# Patient Record
Sex: FEMALE | Race: WHITE | Employment: OTHER | URBAN - METROPOLITAN AREA
[De-identification: names, ages, dates, MRNs, and addresses within clinical notes are randomized per-mention and may not be internally consistent; named-entity substitution may affect disease eponyms.]

---

## 2018-02-09 ENCOUNTER — HOSPITAL ENCOUNTER (OUTPATIENT)
Dept: MRI IMAGING | Age: 68
Discharge: HOME | End: 2018-02-09
Attending: ORTHOPAEDIC SURGERY
Payer: MEDICARE

## 2018-02-09 DIAGNOSIS — M54.40 LOW BACK PAIN WITH SCIATICA: ICD-10-CM

## 2018-02-09 PROCEDURE — 72148 MRI LUMBAR SPINE W/O DYE: CPT

## 2018-06-04 ENCOUNTER — HOSPITAL ENCOUNTER (OUTPATIENT)
Dept: PREADMISSION TESTING | Age: 68
Discharge: HOME | End: 2018-06-04
Attending: ORTHOPAEDIC SURGERY
Payer: MEDICARE

## 2018-06-04 ENCOUNTER — HOSPITAL ENCOUNTER (OUTPATIENT)
Dept: GENERAL RADIOLOGY | Age: 68
Discharge: HOME | End: 2018-06-04
Attending: ORTHOPAEDIC SURGERY
Payer: MEDICARE

## 2018-06-04 VITALS
TEMPERATURE: 97.9 F | BODY MASS INDEX: 27.51 KG/M2 | HEART RATE: 82 BPM | SYSTOLIC BLOOD PRESSURE: 149 MMHG | OXYGEN SATURATION: 99 % | DIASTOLIC BLOOD PRESSURE: 73 MMHG | RESPIRATION RATE: 20 BRPM | WEIGHT: 145.72 LBS | HEIGHT: 61 IN

## 2018-06-04 LAB
25(OH)D3 SERPL-MCNC: 19.4 NG/ML (ref 30–100)
ABO + RH BLD: NORMAL
ALBUMIN SERPL-MCNC: 4.1 G/DL (ref 3.5–5)
ALBUMIN/GLOB SERPL: 1 {RATIO} (ref 1.1–2.2)
ALP SERPL-CCNC: 75 U/L (ref 45–117)
ALT SERPL-CCNC: 58 U/L (ref 12–78)
ANION GAP SERPL CALC-SCNC: 6 MMOL/L (ref 5–15)
APPEARANCE UR: CLEAR
AST SERPL-CCNC: 24 U/L (ref 15–37)
BACTERIA URNS QL MICRO: NEGATIVE /HPF
BILIRUB SERPL-MCNC: 0.6 MG/DL (ref 0.2–1)
BILIRUB UR QL: NEGATIVE
BLOOD GROUP ANTIBODIES SERPL: NORMAL
BUN SERPL-MCNC: 37 MG/DL (ref 6–20)
BUN/CREAT SERPL: 23 (ref 12–20)
CALCIUM SERPL-MCNC: 9.6 MG/DL (ref 8.5–10.1)
CHLORIDE SERPL-SCNC: 105 MMOL/L (ref 97–108)
CO2 SERPL-SCNC: 28 MMOL/L (ref 21–32)
COLOR UR: NORMAL
CREAT SERPL-MCNC: 1.59 MG/DL (ref 0.55–1.02)
EPITH CASTS URNS QL MICRO: NORMAL /LPF
ERYTHROCYTE [DISTWIDTH] IN BLOOD BY AUTOMATED COUNT: 12 % (ref 11.5–14.5)
EST. AVERAGE GLUCOSE BLD GHB EST-MCNC: 111 MG/DL
GLOBULIN SER CALC-MCNC: 4 G/DL (ref 2–4)
GLUCOSE SERPL-MCNC: 90 MG/DL (ref 65–100)
GLUCOSE UR STRIP.AUTO-MCNC: NEGATIVE MG/DL
HBA1C MFR BLD: 5.5 % (ref 4.2–6.3)
HCT VFR BLD AUTO: 36.5 % (ref 35–47)
HGB BLD-MCNC: 12.1 G/DL (ref 11.5–16)
HGB UR QL STRIP: NEGATIVE
HYALINE CASTS URNS QL MICRO: NORMAL /LPF (ref 0–5)
INR PPP: 1 (ref 0.9–1.1)
KETONES UR QL STRIP.AUTO: NEGATIVE MG/DL
LEUKOCYTE ESTERASE UR QL STRIP.AUTO: NEGATIVE
MCH RBC QN AUTO: 31.6 PG (ref 26–34)
MCHC RBC AUTO-ENTMCNC: 33.2 G/DL (ref 30–36.5)
MCV RBC AUTO: 95.3 FL (ref 80–99)
NITRITE UR QL STRIP.AUTO: NEGATIVE
NRBC # BLD: 0 K/UL (ref 0–0.01)
NRBC BLD-RTO: 0 PER 100 WBC
PH UR STRIP: 6 [PH] (ref 5–8)
PLATELET # BLD AUTO: 334 K/UL (ref 150–400)
PMV BLD AUTO: 9.8 FL (ref 8.9–12.9)
POTASSIUM SERPL-SCNC: 4.4 MMOL/L (ref 3.5–5.1)
PREALB SERPL-MCNC: 33 MG/DL (ref 20–40)
PROT SERPL-MCNC: 8.1 G/DL (ref 6.4–8.2)
PROT UR STRIP-MCNC: NEGATIVE MG/DL
PROTHROMBIN TIME: 10 SEC (ref 9–11.1)
RBC # BLD AUTO: 3.83 M/UL (ref 3.8–5.2)
RBC #/AREA URNS HPF: NORMAL /HPF (ref 0–5)
SODIUM SERPL-SCNC: 139 MMOL/L (ref 136–145)
SP GR UR REFRACTOMETRY: 1.01 (ref 1–1.03)
SPECIMEN EXP DATE BLD: NORMAL
UA: UC IF INDICATED,UAUC: NORMAL
UROBILINOGEN UR QL STRIP.AUTO: 0.2 EU/DL (ref 0.2–1)
WBC # BLD AUTO: 6.6 K/UL (ref 3.6–11)
WBC URNS QL MICRO: NORMAL /HPF (ref 0–4)

## 2018-06-04 PROCEDURE — 83036 HEMOGLOBIN GLYCOSYLATED A1C: CPT | Performed by: ORTHOPAEDIC SURGERY

## 2018-06-04 PROCEDURE — 71046 X-RAY EXAM CHEST 2 VIEWS: CPT

## 2018-06-04 PROCEDURE — 81001 URINALYSIS AUTO W/SCOPE: CPT | Performed by: ORTHOPAEDIC SURGERY

## 2018-06-04 PROCEDURE — 93005 ELECTROCARDIOGRAM TRACING: CPT

## 2018-06-04 PROCEDURE — 80053 COMPREHEN METABOLIC PANEL: CPT | Performed by: ORTHOPAEDIC SURGERY

## 2018-06-04 PROCEDURE — 85027 COMPLETE CBC AUTOMATED: CPT | Performed by: ORTHOPAEDIC SURGERY

## 2018-06-04 PROCEDURE — 86900 BLOOD TYPING SEROLOGIC ABO: CPT | Performed by: ORTHOPAEDIC SURGERY

## 2018-06-04 PROCEDURE — 82306 VITAMIN D 25 HYDROXY: CPT | Performed by: ORTHOPAEDIC SURGERY

## 2018-06-04 PROCEDURE — 85610 PROTHROMBIN TIME: CPT | Performed by: ORTHOPAEDIC SURGERY

## 2018-06-04 PROCEDURE — 36415 COLL VENOUS BLD VENIPUNCTURE: CPT | Performed by: ORTHOPAEDIC SURGERY

## 2018-06-04 PROCEDURE — 84134 ASSAY OF PREALBUMIN: CPT | Performed by: ORTHOPAEDIC SURGERY

## 2018-06-04 RX ORDER — CEFAZOLIN SODIUM/WATER 2 G/20 ML
2 SYRINGE (ML) INTRAVENOUS ONCE
Status: CANCELLED | OUTPATIENT
Start: 2018-06-18 | End: 2018-06-18

## 2018-06-04 RX ORDER — DULOXETIN HYDROCHLORIDE 60 MG/1
60 CAPSULE, DELAYED RELEASE ORAL
COMMUNITY
End: 2019-01-29 | Stop reason: ALTCHOICE

## 2018-06-04 RX ORDER — PREGABALIN 150 MG/1
150 CAPSULE ORAL ONCE
Status: CANCELLED | OUTPATIENT
Start: 2018-06-18 | End: 2018-06-18

## 2018-06-04 RX ORDER — SODIUM CHLORIDE, SODIUM LACTATE, POTASSIUM CHLORIDE, CALCIUM CHLORIDE 600; 310; 30; 20 MG/100ML; MG/100ML; MG/100ML; MG/100ML
25 INJECTION, SOLUTION INTRAVENOUS CONTINUOUS
Status: CANCELLED | OUTPATIENT
Start: 2018-06-18

## 2018-06-04 RX ORDER — ACETAMINOPHEN 10 MG/ML
1000 INJECTION, SOLUTION INTRAVENOUS ONCE
Status: CANCELLED | OUTPATIENT
Start: 2018-06-18 | End: 2018-06-18

## 2018-06-04 RX ORDER — DICLOFENAC SODIUM 75 MG/1
75 TABLET, DELAYED RELEASE ORAL 2 TIMES DAILY
COMMUNITY
End: 2019-01-29

## 2018-06-04 NOTE — PERIOP NOTES
[facility]  Preoperative Instructions        Surgery Date 06/18/18         Time of Arrival 1 PM  Contact # [phone] [facility]/[phone]    1. On the day of your surgery, please report to the Surgical Services Registration Desk and sign in at your designated time. The Surgery Center is located to the right of the Emergency Room. 2. You must have someone with you to drive you home. You should not drive a car for 24 hours following surgery. Please make arrangements for a friend or family member to stay with you for the first 24 hours after your surgery. 3. Do not have anything to eat or drink (including water, gum, mints, coffee, juice) after midnight ?06/17/18 . ? This may not apply to medications prescribed by your physician. ?(Please note below the special instructions with medications to take the morning of your procedure.)    4. We recommend you do not drink any alcoholic beverages for 24 hours before and after your surgery. 5. Contact your surgeons office for instructions on the following medications: non-steroidal anti-inflammatory drugs (i.e. Advil, Aleve), vitamins, and supplements. (Some surgeons will want you to stop these medications prior to surgery and others may allow you to take them)  **If you are currently taking Plavix, Coumadin, Aspirin and/or other blood-thinning agents, contact your surgeon for instructions. ** Your surgeon will partner with the physician prescribing these medications to determine if it is safe to stop or if you need to continue taking. Please do not stop taking these medications without instructions from your surgeon    6. Wear comfortable clothes. Wear glasses instead of contacts. Do not bring any money or jewelry. Please bring picture ID, insurance card, and any prearranged co-payment or hospital payment. Do not wear make-up, particularly mascara the morning of your surgery.   Do not wear nail polish, particularly if you are having foot /hand surgery. Wear your hair loose or down, no ponytails, buns, jadon pins or clips. All body piercings must be removed. Please shower with antibacterial soap for three consecutive days before and on the morning of surgery, but do not apply any lotions, powders or deodorants after the shower on the day of surgery. Please use a fresh towels after each shower. Please sleep in clean clothes and change bed linens the night before surgery. Please do not shave for 48 hours prior to surgery. Shaving of the face is acceptable. 7. You should understand that if you do not follow these instructions your surgery may be cancelled. If your physical condition changes (I.e. fever, cold or flu) please contact your surgeon as soon as possible. 8. It is important that you be on time. If a situation occurs where you may be late, please call [phone] (OR Holding Area). 9. If you have any questions and or problems, please call [phone] (Pre-admission Testing). 10. Your surgery time may be subject to change. You will receive a phone call the evening prior if your time changes. 11.  If having outpatient surgery, you must have someone to drive you here, stay with you during the duration of your stay, and to drive you home at time of discharge. 12.   In an effort to improve the efficiency, privacy, and safety for all of our Pre-op patients visitors are not allowed in the Holding area. Once you arrive and are registered your family/visitors will be asked to remain in the waiting room. The Pre-op staff will get you from the Surgical Waiting Area and will explain to you and your family/visitors that the Pre-op phase is beginning. The staff will answer any questions and provide instructions for tracking of the patient, by use of the existing tracking number and color-coded status board in the waiting room.   At this time the staff will also ask for your designated spokesperson information in the event that the physician or staff need to provide an update or obtain any pertinent information. The designated spokesperson will be notified if the physician needs to speak to family during the pre-operative phase. If at any time your family/visitors has questions or concerns they may approach the volunteer desk in the waiting area for assistance. Special Instructions:Practice with incentive spirometry/ please bring incentive spirometry day of surgery    MEDICATIONS TO TAKE THE MORNING OF SURGERY WITH A SIP OF WATER:cymbalta,tylenol as needed      I understand a pre-operative phone call will be made to verify my surgery time. In the event that I am not available, I give permission for a message to be left on my answering service and/or with another person?   yes          ___________________      __________   _________    (Signature of Patient)             (Witness)                (Date and Time)

## 2018-06-04 NOTE — PERIOP NOTES
Incentive Spirometer        Using the incentive spirometer helps expand the small air sacs of your lungs, helps you breathe deeply, and helps improve your lung function. Use your incentive spirometer twice a day (10 breaths each time) prior to surgery. How to Use Your Incentive Spirometer:  1. Hold the incentive spirometer in an upright position. 2. Breathe out as usual.   3. Place the mouthpiece in your mouth and seal your lips tightly around it. 4. Take a deep breath. Breathe in slowly and as deeply as possible. Keep the blue flow rate guide between the arrows. 5. Hold your breath as long as possible. Then exhale slowly and allow the piston to fall to the bottom of the column. 6. Rest for a few seconds and repeat steps one through five at least 10 times. PAT Tidal Volume______1500____________  x__2______________  Date______06/04/18_________________    Consuello Curl THE INCENTIVE SPIROMETER WITH YOU TO THE HOSPITAL ON THE DAY OF YOUR SURGERY. Opportunity given to ask and answer questions as well as to observe return demonstration.     Patient signature_____________________________    Witness____________________________

## 2018-06-05 LAB
ATRIAL RATE: 66 BPM
BACTERIA SPEC CULT: ABNORMAL
BACTERIA SPEC CULT: ABNORMAL
CALCULATED P AXIS, ECG09: 69 DEGREES
CALCULATED R AXIS, ECG10: 3 DEGREES
CALCULATED T AXIS, ECG11: 37 DEGREES
DIAGNOSIS, 93000: NORMAL
P-R INTERVAL, ECG05: 154 MS
Q-T INTERVAL, ECG07: 370 MS
QRS DURATION, ECG06: 66 MS
QTC CALCULATION (BEZET), ECG08: 387 MS
SERVICE CMNT-IMP: ABNORMAL
VENTRICULAR RATE, ECG03: 66 BPM

## 2018-06-06 RX ORDER — MUPIROCIN 20 MG/G
OINTMENT TOPICAL 2 TIMES DAILY
Qty: 22 G | Refills: 0 | Status: SHIPPED | OUTPATIENT
Start: 2018-06-06 | End: 2018-06-11

## 2018-06-06 RX ORDER — MUPIROCIN 20 MG/G
OINTMENT TOPICAL 2 TIMES DAILY
COMMUNITY
Start: 2018-06-06 | End: 2018-06-11

## 2018-06-06 RX ORDER — CHOLECALCIFEROL (VITAMIN D3) 125 MCG
1 CAPSULE ORAL DAILY
COMMUNITY

## 2018-06-06 NOTE — ADVANCED PRACTICE NURSE
PC to pt regarding positive nasal culture and need for tx with Mupirocin ointment BID x 5 days to B nares starting today. Pt also advised of results of vitamin D level and recommendation per Dr. Lori Kilgore to take OTC Vitamin D 2000 iu daily. Pt states she had pre-op appt today with Dr. Chaim Car and was advised of the same Vitamin D instructions. Rx escribed to pt's pharmacy of choice. PTA medlist updated.

## 2018-06-17 ENCOUNTER — ANESTHESIA EVENT (OUTPATIENT)
Dept: SURGERY | Age: 68
DRG: 520 | End: 2018-06-17
Payer: MEDICARE

## 2018-06-18 ENCOUNTER — APPOINTMENT (OUTPATIENT)
Dept: GENERAL RADIOLOGY | Age: 68
DRG: 520 | End: 2018-06-18
Attending: ORTHOPAEDIC SURGERY
Payer: MEDICARE

## 2018-06-18 ENCOUNTER — HOSPITAL ENCOUNTER (INPATIENT)
Age: 68
LOS: 1 days | Discharge: HOME | DRG: 520 | End: 2018-06-18
Attending: ORTHOPAEDIC SURGERY | Admitting: ORTHOPAEDIC SURGERY
Payer: MEDICARE

## 2018-06-18 ENCOUNTER — ANESTHESIA (OUTPATIENT)
Dept: SURGERY | Age: 68
DRG: 520 | End: 2018-06-18
Payer: MEDICARE

## 2018-06-18 VITALS
DIASTOLIC BLOOD PRESSURE: 71 MMHG | BODY MASS INDEX: 27.3 KG/M2 | SYSTOLIC BLOOD PRESSURE: 120 MMHG | RESPIRATION RATE: 16 BRPM | OXYGEN SATURATION: 92 % | HEART RATE: 79 BPM | TEMPERATURE: 97.7 F | HEIGHT: 61 IN | WEIGHT: 144.62 LBS

## 2018-06-18 DIAGNOSIS — M71.38 SYNOVIAL CYST OF LUMBAR SPINE: Primary | ICD-10-CM

## 2018-06-18 LAB
ANION GAP BLD CALC-SCNC: 14 MMOL/L (ref 10–20)
BUN BLD-MCNC: 31 MG/DL (ref 9–20)
CA-I BLD-MCNC: 1.22 MMOL/L (ref 1.12–1.32)
CHLORIDE BLD-SCNC: 104 MMOL/L (ref 98–107)
CO2 BLD-SCNC: 26 MMOL/L (ref 21–32)
CREAT BLD-MCNC: 1.1 MG/DL (ref 0.6–1.3)
GLUCOSE BLD-MCNC: 92 MG/DL (ref 65–100)
HCT VFR BLD CALC: 37 % (ref 35–47)
POTASSIUM BLD-SCNC: 4.3 MMOL/L (ref 3.5–5.1)
SERVICE CMNT-IMP: ABNORMAL
SODIUM BLD-SCNC: 138 MMOL/L (ref 136–145)

## 2018-06-18 PROCEDURE — 77030026438 HC STYL ET INTUB CARD -A: Performed by: NURSE ANESTHETIST, CERTIFIED REGISTERED

## 2018-06-18 PROCEDURE — 72020 X-RAY EXAM OF SPINE 1 VIEW: CPT

## 2018-06-18 PROCEDURE — 74011000250 HC RX REV CODE- 250

## 2018-06-18 PROCEDURE — 77030022704 HC SUT VLOC COVD -B: Performed by: ORTHOPAEDIC SURGERY

## 2018-06-18 PROCEDURE — 77010033678 HC OXYGEN DAILY

## 2018-06-18 PROCEDURE — 77030034849: Performed by: ORTHOPAEDIC SURGERY

## 2018-06-18 PROCEDURE — 74011250636 HC RX REV CODE- 250/636: Performed by: ORTHOPAEDIC SURGERY

## 2018-06-18 PROCEDURE — 77030012961 HC IRR KT CYSTO/TUR ICUM -A: Performed by: ORTHOPAEDIC SURGERY

## 2018-06-18 PROCEDURE — 65270000029 HC RM PRIVATE

## 2018-06-18 PROCEDURE — 76010000161 HC OR TIME 1 TO 1.5 HR INTENSV-TIER 1: Performed by: ORTHOPAEDIC SURGERY

## 2018-06-18 PROCEDURE — 77030008684 HC TU ET CUF COVD -B: Performed by: NURSE ANESTHETIST, CERTIFIED REGISTERED

## 2018-06-18 PROCEDURE — 77030020782 HC GWN BAIR PAWS FLX 3M -B

## 2018-06-18 PROCEDURE — 77030019908 HC STETH ESOPH SIMS -A: Performed by: NURSE ANESTHETIST, CERTIFIED REGISTERED

## 2018-06-18 PROCEDURE — 76060000033 HC ANESTHESIA 1 TO 1.5 HR: Performed by: ORTHOPAEDIC SURGERY

## 2018-06-18 PROCEDURE — 74011000250 HC RX REV CODE- 250: Performed by: ORTHOPAEDIC SURGERY

## 2018-06-18 PROCEDURE — 76210000006 HC OR PH I REC 0.5 TO 1 HR: Performed by: ORTHOPAEDIC SURGERY

## 2018-06-18 PROCEDURE — 80047 BASIC METABLC PNL IONIZED CA: CPT

## 2018-06-18 PROCEDURE — 74011250636 HC RX REV CODE- 250/636

## 2018-06-18 PROCEDURE — 01NB0ZZ RELEASE LUMBAR NERVE, OPEN APPROACH: ICD-10-PCS | Performed by: ORTHOPAEDIC SURGERY

## 2018-06-18 PROCEDURE — 74011250637 HC RX REV CODE- 250/637: Performed by: ORTHOPAEDIC SURGERY

## 2018-06-18 PROCEDURE — 0SB00ZZ EXCISION OF LUMBAR VERTEBRAL JOINT, OPEN APPROACH: ICD-10-PCS | Performed by: ORTHOPAEDIC SURGERY

## 2018-06-18 PROCEDURE — 77030029099 HC BN WAX SSPC -A: Performed by: ORTHOPAEDIC SURGERY

## 2018-06-18 PROCEDURE — 77030032490 HC SLV COMPR SCD KNE COVD -B: Performed by: ORTHOPAEDIC SURGERY

## 2018-06-18 PROCEDURE — 74011000272 HC RX REV CODE- 272: Performed by: ORTHOPAEDIC SURGERY

## 2018-06-18 PROCEDURE — 77030003029 HC SUT VCRL J&J -B: Performed by: ORTHOPAEDIC SURGERY

## 2018-06-18 PROCEDURE — 77030018836 HC SOL IRR NACL ICUM -A: Performed by: ORTHOPAEDIC SURGERY

## 2018-06-18 PROCEDURE — 77030003028 HC SUT VCRL J&J -A: Performed by: ORTHOPAEDIC SURGERY

## 2018-06-18 PROCEDURE — 76001 XR FLUOROSCOPY OVER 60 MINUTES: CPT

## 2018-06-18 PROCEDURE — 74011000258 HC RX REV CODE- 258: Performed by: ORTHOPAEDIC SURGERY

## 2018-06-18 PROCEDURE — 77030008467 HC STPLR SKN COVD -B: Performed by: ORTHOPAEDIC SURGERY

## 2018-06-18 PROCEDURE — 77030011266 HC ELECTRD BLD INSL COVD -A: Performed by: ORTHOPAEDIC SURGERY

## 2018-06-18 PROCEDURE — 77030033138 HC SUT PGA STRATFX J&J -B: Performed by: ORTHOPAEDIC SURGERY

## 2018-06-18 PROCEDURE — 77030004391 HC BUR FLUT MEDT -C: Performed by: ORTHOPAEDIC SURGERY

## 2018-06-18 PROCEDURE — 77030003666 HC NDL SPINAL BD -A: Performed by: ORTHOPAEDIC SURGERY

## 2018-06-18 PROCEDURE — 74011250636 HC RX REV CODE- 250/636: Performed by: ANESTHESIOLOGY

## 2018-06-18 PROCEDURE — 77030014647 HC SEAL FBRN TISSL BAXT -D: Performed by: ORTHOPAEDIC SURGERY

## 2018-06-18 PROCEDURE — 77030039267 HC ADH SKN EXOFIN S2SG -B: Performed by: ORTHOPAEDIC SURGERY

## 2018-06-18 RX ORDER — NEOSTIGMINE METHYLSULFATE 1 MG/ML
INJECTION INTRAVENOUS AS NEEDED
Status: DISCONTINUED | OUTPATIENT
Start: 2018-06-18 | End: 2018-06-18 | Stop reason: HOSPADM

## 2018-06-18 RX ORDER — LIDOCAINE HYDROCHLORIDE 10 MG/ML
0.1 INJECTION, SOLUTION EPIDURAL; INFILTRATION; INTRACAUDAL; PERINEURAL AS NEEDED
Status: DISCONTINUED | OUTPATIENT
Start: 2018-06-18 | End: 2018-06-18 | Stop reason: HOSPADM

## 2018-06-18 RX ORDER — MELATONIN
2000 DAILY
Status: DISCONTINUED | OUTPATIENT
Start: 2018-06-19 | End: 2018-06-18 | Stop reason: HOSPADM

## 2018-06-18 RX ORDER — SODIUM CHLORIDE 9 MG/ML
125 INJECTION, SOLUTION INTRAVENOUS CONTINUOUS
Status: DISCONTINUED | OUTPATIENT
Start: 2018-06-18 | End: 2018-06-18 | Stop reason: HOSPADM

## 2018-06-18 RX ORDER — DULOXETIN HYDROCHLORIDE 30 MG/1
60 CAPSULE, DELAYED RELEASE ORAL
Status: DISCONTINUED | OUTPATIENT
Start: 2018-06-19 | End: 2018-06-18 | Stop reason: HOSPADM

## 2018-06-18 RX ORDER — ROCURONIUM BROMIDE 10 MG/ML
INJECTION, SOLUTION INTRAVENOUS AS NEEDED
Status: DISCONTINUED | OUTPATIENT
Start: 2018-06-18 | End: 2018-06-18 | Stop reason: HOSPADM

## 2018-06-18 RX ORDER — ACETAMINOPHEN 500 MG
500 TABLET ORAL EVERY 6 HOURS
Qty: 56 TAB | Refills: 0 | Status: SHIPPED | OUTPATIENT
Start: 2018-06-18 | End: 2018-07-02

## 2018-06-18 RX ORDER — NALOXONE HYDROCHLORIDE 0.4 MG/ML
0.4 INJECTION, SOLUTION INTRAMUSCULAR; INTRAVENOUS; SUBCUTANEOUS AS NEEDED
Status: DISCONTINUED | OUTPATIENT
Start: 2018-06-18 | End: 2018-06-18 | Stop reason: HOSPADM

## 2018-06-18 RX ORDER — MORPHINE SULFATE 10 MG/ML
2 INJECTION, SOLUTION INTRAMUSCULAR; INTRAVENOUS
Status: DISCONTINUED | OUTPATIENT
Start: 2018-06-18 | End: 2018-06-18 | Stop reason: HOSPADM

## 2018-06-18 RX ORDER — AMOXICILLIN 250 MG
1 CAPSULE ORAL 2 TIMES DAILY
Status: DISCONTINUED | OUTPATIENT
Start: 2018-06-18 | End: 2018-06-18 | Stop reason: HOSPADM

## 2018-06-18 RX ORDER — SODIUM CHLORIDE, SODIUM LACTATE, POTASSIUM CHLORIDE, CALCIUM CHLORIDE 600; 310; 30; 20 MG/100ML; MG/100ML; MG/100ML; MG/100ML
25 INJECTION, SOLUTION INTRAVENOUS CONTINUOUS
Status: DISCONTINUED | OUTPATIENT
Start: 2018-06-18 | End: 2018-06-18 | Stop reason: HOSPADM

## 2018-06-18 RX ORDER — SODIUM CHLORIDE 0.9 % (FLUSH) 0.9 %
5-10 SYRINGE (ML) INJECTION AS NEEDED
Status: DISCONTINUED | OUTPATIENT
Start: 2018-06-18 | End: 2018-06-18 | Stop reason: HOSPADM

## 2018-06-18 RX ORDER — MUPIROCIN 20 MG/G
OINTMENT TOPICAL 2 TIMES DAILY
COMMUNITY
End: 2019-01-29 | Stop reason: ALTCHOICE

## 2018-06-18 RX ORDER — HYDROCODONE BITARTRATE AND ACETAMINOPHEN 5; 325 MG/1; MG/1
1-2 TABLET ORAL
Status: DISCONTINUED | OUTPATIENT
Start: 2018-06-18 | End: 2018-06-18 | Stop reason: HOSPADM

## 2018-06-18 RX ORDER — HYDROCODONE BITARTRATE AND ACETAMINOPHEN 5; 325 MG/1; MG/1
1-2 TABLET ORAL
Qty: 80 TAB | Refills: 0 | Status: SHIPPED | OUTPATIENT
Start: 2018-06-18 | End: 2019-01-29 | Stop reason: RX

## 2018-06-18 RX ORDER — FENTANYL CITRATE 50 UG/ML
INJECTION, SOLUTION INTRAMUSCULAR; INTRAVENOUS AS NEEDED
Status: DISCONTINUED | OUTPATIENT
Start: 2018-06-18 | End: 2018-06-18 | Stop reason: HOSPADM

## 2018-06-18 RX ORDER — GLYCOPYRROLATE 0.2 MG/ML
INJECTION INTRAMUSCULAR; INTRAVENOUS AS NEEDED
Status: DISCONTINUED | OUTPATIENT
Start: 2018-06-18 | End: 2018-06-18 | Stop reason: HOSPADM

## 2018-06-18 RX ORDER — DIPHENHYDRAMINE HYDROCHLORIDE 50 MG/ML
12.5 INJECTION, SOLUTION INTRAMUSCULAR; INTRAVENOUS
Status: DISCONTINUED | OUTPATIENT
Start: 2018-06-18 | End: 2018-06-18 | Stop reason: HOSPADM

## 2018-06-18 RX ORDER — ACETAMINOPHEN 10 MG/ML
1000 INJECTION, SOLUTION INTRAVENOUS ONCE
Status: COMPLETED | OUTPATIENT
Start: 2018-06-18 | End: 2018-06-18

## 2018-06-18 RX ORDER — POLYETHYLENE GLYCOL 3350 17 G/17G
17 POWDER, FOR SOLUTION ORAL DAILY
Status: DISCONTINUED | OUTPATIENT
Start: 2018-06-19 | End: 2018-06-18 | Stop reason: HOSPADM

## 2018-06-18 RX ORDER — SUCCINYLCHOLINE CHLORIDE 20 MG/ML
INJECTION INTRAMUSCULAR; INTRAVENOUS AS NEEDED
Status: DISCONTINUED | OUTPATIENT
Start: 2018-06-18 | End: 2018-06-18 | Stop reason: HOSPADM

## 2018-06-18 RX ORDER — PHENYLEPHRINE HCL IN 0.9% NACL 0.4MG/10ML
SYRINGE (ML) INTRAVENOUS AS NEEDED
Status: DISCONTINUED | OUTPATIENT
Start: 2018-06-18 | End: 2018-06-18 | Stop reason: HOSPADM

## 2018-06-18 RX ORDER — CEFAZOLIN SODIUM/WATER 2 G/20 ML
2 SYRINGE (ML) INTRAVENOUS ONCE
Status: COMPLETED | OUTPATIENT
Start: 2018-06-18 | End: 2018-06-18

## 2018-06-18 RX ORDER — SODIUM CHLORIDE 0.9 % (FLUSH) 0.9 %
5-10 SYRINGE (ML) INJECTION EVERY 8 HOURS
Status: DISCONTINUED | OUTPATIENT
Start: 2018-06-19 | End: 2018-06-18 | Stop reason: HOSPADM

## 2018-06-18 RX ORDER — KETOROLAC TROMETHAMINE 30 MG/ML
15 INJECTION, SOLUTION INTRAMUSCULAR; INTRAVENOUS EVERY 6 HOURS
Status: DISCONTINUED | OUTPATIENT
Start: 2018-06-18 | End: 2018-06-18 | Stop reason: HOSPADM

## 2018-06-18 RX ORDER — FENTANYL CITRATE 50 UG/ML
25 INJECTION, SOLUTION INTRAMUSCULAR; INTRAVENOUS
Status: DISCONTINUED | OUTPATIENT
Start: 2018-06-18 | End: 2018-06-18 | Stop reason: HOSPADM

## 2018-06-18 RX ORDER — HYDROXYZINE HYDROCHLORIDE 10 MG/1
10 TABLET, FILM COATED ORAL
Status: DISCONTINUED | OUTPATIENT
Start: 2018-06-18 | End: 2018-06-18 | Stop reason: HOSPADM

## 2018-06-18 RX ORDER — ONDANSETRON 2 MG/ML
INJECTION INTRAMUSCULAR; INTRAVENOUS AS NEEDED
Status: DISCONTINUED | OUTPATIENT
Start: 2018-06-18 | End: 2018-06-18 | Stop reason: HOSPADM

## 2018-06-18 RX ORDER — POLYETHYLENE GLYCOL 3350 17 G/17G
17 POWDER, FOR SOLUTION ORAL DAILY
Qty: 255 G | Refills: 0 | Status: SHIPPED | OUTPATIENT
Start: 2018-06-18 | End: 2018-07-03

## 2018-06-18 RX ORDER — FACIAL-BODY WIPES
10 EACH TOPICAL DAILY PRN
Status: DISCONTINUED | OUTPATIENT
Start: 2018-06-20 | End: 2018-06-18 | Stop reason: HOSPADM

## 2018-06-18 RX ORDER — PROPOFOL 10 MG/ML
INJECTION, EMULSION INTRAVENOUS AS NEEDED
Status: DISCONTINUED | OUTPATIENT
Start: 2018-06-18 | End: 2018-06-18 | Stop reason: HOSPADM

## 2018-06-18 RX ORDER — HYDROMORPHONE HYDROCHLORIDE 2 MG/1
4 TABLET ORAL
Status: DISCONTINUED | OUTPATIENT
Start: 2018-06-18 | End: 2018-06-18

## 2018-06-18 RX ORDER — DEXAMETHASONE SODIUM PHOSPHATE 4 MG/ML
10 INJECTION, SOLUTION INTRA-ARTICULAR; INTRALESIONAL; INTRAMUSCULAR; INTRAVENOUS; SOFT TISSUE ONCE
Status: DISCONTINUED | OUTPATIENT
Start: 2018-06-19 | End: 2018-06-18 | Stop reason: HOSPADM

## 2018-06-18 RX ORDER — FAMOTIDINE 20 MG/1
20 TABLET, FILM COATED ORAL EVERY EVENING
Status: DISCONTINUED | OUTPATIENT
Start: 2018-06-18 | End: 2018-06-18 | Stop reason: HOSPADM

## 2018-06-18 RX ORDER — MIDAZOLAM HYDROCHLORIDE 1 MG/ML
INJECTION, SOLUTION INTRAMUSCULAR; INTRAVENOUS AS NEEDED
Status: DISCONTINUED | OUTPATIENT
Start: 2018-06-18 | End: 2018-06-18 | Stop reason: HOSPADM

## 2018-06-18 RX ORDER — DIAZEPAM 5 MG/1
5 TABLET ORAL
Status: DISCONTINUED | OUTPATIENT
Start: 2018-06-18 | End: 2018-06-18 | Stop reason: HOSPADM

## 2018-06-18 RX ORDER — DEXAMETHASONE SODIUM PHOSPHATE 4 MG/ML
INJECTION, SOLUTION INTRA-ARTICULAR; INTRALESIONAL; INTRAMUSCULAR; INTRAVENOUS; SOFT TISSUE AS NEEDED
Status: DISCONTINUED | OUTPATIENT
Start: 2018-06-18 | End: 2018-06-18 | Stop reason: HOSPADM

## 2018-06-18 RX ORDER — AMOXICILLIN 250 MG
1 CAPSULE ORAL 2 TIMES DAILY
Qty: 60 TAB | Refills: 0 | Status: SHIPPED | OUTPATIENT
Start: 2018-06-18

## 2018-06-18 RX ORDER — GABAPENTIN 100 MG/1
100 CAPSULE ORAL 3 TIMES DAILY
Status: DISCONTINUED | OUTPATIENT
Start: 2018-06-18 | End: 2018-06-18 | Stop reason: HOSPADM

## 2018-06-18 RX ORDER — ONDANSETRON 2 MG/ML
4 INJECTION INTRAMUSCULAR; INTRAVENOUS AS NEEDED
Status: DISCONTINUED | OUTPATIENT
Start: 2018-06-18 | End: 2018-06-18 | Stop reason: HOSPADM

## 2018-06-18 RX ORDER — HYDROMORPHONE HYDROCHLORIDE 2 MG/1
2 TABLET ORAL
Status: DISCONTINUED | OUTPATIENT
Start: 2018-06-18 | End: 2018-06-18

## 2018-06-18 RX ORDER — ONDANSETRON 2 MG/ML
4 INJECTION INTRAMUSCULAR; INTRAVENOUS
Status: DISCONTINUED | OUTPATIENT
Start: 2018-06-18 | End: 2018-06-18 | Stop reason: HOSPADM

## 2018-06-18 RX ORDER — MUPIROCIN 20 MG/G
OINTMENT TOPICAL 2 TIMES DAILY
Status: DISCONTINUED | OUTPATIENT
Start: 2018-06-18 | End: 2018-06-18 | Stop reason: HOSPADM

## 2018-06-18 RX ORDER — HYDROMORPHONE HYDROCHLORIDE 2 MG/ML
1 INJECTION, SOLUTION INTRAMUSCULAR; INTRAVENOUS; SUBCUTANEOUS
Status: DISCONTINUED | OUTPATIENT
Start: 2018-06-18 | End: 2018-06-18 | Stop reason: HOSPADM

## 2018-06-18 RX ORDER — CEFAZOLIN SODIUM/WATER 2 G/20 ML
2 SYRINGE (ML) INTRAVENOUS EVERY 8 HOURS
Status: DISCONTINUED | OUTPATIENT
Start: 2018-06-18 | End: 2018-06-18 | Stop reason: HOSPADM

## 2018-06-18 RX ORDER — SODIUM CHLORIDE 0.9 % (FLUSH) 0.9 %
5-10 SYRINGE (ML) INJECTION EVERY 8 HOURS
Status: DISCONTINUED | OUTPATIENT
Start: 2018-06-18 | End: 2018-06-18 | Stop reason: HOSPADM

## 2018-06-18 RX ORDER — CYCLOBENZAPRINE HCL 10 MG
10 TABLET ORAL
Status: DISCONTINUED | OUTPATIENT
Start: 2018-06-18 | End: 2018-06-18 | Stop reason: HOSPADM

## 2018-06-18 RX ORDER — DICLOFENAC SODIUM 75 MG/1
75 TABLET, DELAYED RELEASE ORAL 2 TIMES DAILY
Status: DISCONTINUED | OUTPATIENT
Start: 2018-06-18 | End: 2018-06-18 | Stop reason: HOSPADM

## 2018-06-18 RX ORDER — ACETAMINOPHEN 500 MG
1000 TABLET ORAL EVERY 6 HOURS
Status: DISCONTINUED | OUTPATIENT
Start: 2018-06-18 | End: 2018-06-18 | Stop reason: HOSPADM

## 2018-06-18 RX ORDER — PREGABALIN 75 MG/1
150 CAPSULE ORAL ONCE
Status: COMPLETED | OUTPATIENT
Start: 2018-06-18 | End: 2018-06-18

## 2018-06-18 RX ADMIN — Medication 40 MCG: at 14:43

## 2018-06-18 RX ADMIN — ROCURONIUM BROMIDE 30 MG: 10 INJECTION, SOLUTION INTRAVENOUS at 14:15

## 2018-06-18 RX ADMIN — MIDAZOLAM HYDROCHLORIDE 2 MG: 1 INJECTION, SOLUTION INTRAMUSCULAR; INTRAVENOUS at 13:54

## 2018-06-18 RX ADMIN — SENNOSIDES AND DOCUSATE SODIUM 1 TABLET: 8.6; 5 TABLET ORAL at 17:34

## 2018-06-18 RX ADMIN — ROPIVACAINE HYDROCHLORIDE: 5 INJECTION, SOLUTION EPIDURAL; INFILTRATION; PERINEURAL at 15:00

## 2018-06-18 RX ADMIN — ACETAMINOPHEN 1000 MG: 500 TABLET, FILM COATED ORAL at 17:33

## 2018-06-18 RX ADMIN — ACETAMINOPHEN 1000 MG: 10 INJECTION, SOLUTION INTRAVENOUS at 12:57

## 2018-06-18 RX ADMIN — SODIUM CHLORIDE, SODIUM LACTATE, POTASSIUM CHLORIDE, AND CALCIUM CHLORIDE 25 ML/HR: 600; 310; 30; 20 INJECTION, SOLUTION INTRAVENOUS at 12:53

## 2018-06-18 RX ADMIN — PREGABALIN 150 MG: 75 CAPSULE ORAL at 12:53

## 2018-06-18 RX ADMIN — FAMOTIDINE 20 MG: 20 TABLET, FILM COATED ORAL at 17:33

## 2018-06-18 RX ADMIN — ONDANSETRON 4 MG: 2 INJECTION INTRAMUSCULAR; INTRAVENOUS at 14:59

## 2018-06-18 RX ADMIN — BACITRACIN: 50000 INJECTION, POWDER, FOR SOLUTION INTRAMUSCULAR at 15:00

## 2018-06-18 RX ADMIN — KETOROLAC TROMETHAMINE 15 MG: 30 INJECTION, SOLUTION INTRAMUSCULAR at 17:36

## 2018-06-18 RX ADMIN — SODIUM CHLORIDE 125 ML/HR: 900 INJECTION, SOLUTION INTRAVENOUS at 16:00

## 2018-06-18 RX ADMIN — DEXAMETHASONE SODIUM PHOSPHATE 8 MG: 4 INJECTION, SOLUTION INTRA-ARTICULAR; INTRALESIONAL; INTRAMUSCULAR; INTRAVENOUS; SOFT TISSUE at 14:10

## 2018-06-18 RX ADMIN — Medication 80 MCG: at 14:50

## 2018-06-18 RX ADMIN — DICLOFENAC SODIUM 75 MG: 75 TABLET, DELAYED RELEASE ORAL at 17:33

## 2018-06-18 RX ADMIN — FENTANYL CITRATE 100 MCG: 50 INJECTION, SOLUTION INTRAMUSCULAR; INTRAVENOUS at 13:57

## 2018-06-18 RX ADMIN — Medication 2 G: at 14:30

## 2018-06-18 RX ADMIN — GLYCOPYRROLATE 0.5 MG: 0.2 INJECTION INTRAMUSCULAR; INTRAVENOUS at 14:59

## 2018-06-18 RX ADMIN — Medication 80 MCG: at 14:42

## 2018-06-18 RX ADMIN — PROPOFOL 120 MG: 10 INJECTION, EMULSION INTRAVENOUS at 13:58

## 2018-06-18 RX ADMIN — SUCCINYLCHOLINE CHLORIDE 80 MG: 20 INJECTION INTRAMUSCULAR; INTRAVENOUS at 13:58

## 2018-06-18 RX ADMIN — NEOSTIGMINE METHYLSULFATE 3 MG: 1 INJECTION INTRAVENOUS at 14:59

## 2018-06-18 NOTE — PERIOP NOTES
TRANSFER - OUT REPORT:    Verbal report given to [facility] RN(name) on Katheren Dance  being transferred to [facility](unit) for routine post - op       Report consisted of patients Situation, Background, Assessment and   Recommendations(SBAR). Information from the following report(s) SBAR, OR Summary, Intake/Output and MAR was reviewed with the receiving nurse. Opportunity for questions and clarification was provided.       Patient transported with:   O2 @ 2 liters  Tech

## 2018-06-18 NOTE — IP AVS SNAPSHOT
[address] [facility] 
[phone] Patient: Ellis Newman MRN: [id number] LBF:[id number] A check kevin indicates which time of day the medication should be taken. My Medications START taking these medications Instructions Each Dose to Equal  
 Morning Noon Evening Bedtime HYDROcodone-acetaminophen 5-325 mg per tablet Commonly known as:  Orren Laverne Your last dose was: Your next dose is: Take 1-2 Tabs by mouth every four (4) hours as needed for Pain. Max Daily Amount: 12 Tabs. Use caution when taking medications containing tylenol. 1-2 Tab  
    
   
   
   
  
 polyethylene glycol 17 gram/dose powder Commonly known as:  Caryn Plane Your last dose was: Your next dose is: Take 17 g by mouth daily for 15 days. 17 g  
    
   
   
   
  
 senna-docusate 8.6-50 mg per tablet Commonly known as:  SENNA PLUS Your last dose was: Your next dose is: Take 1 Tab by mouth two (2) times a day. 1 Tab CHANGE how you take these medications Instructions Each Dose to Equal  
 Morning Noon Evening Bedtime  
 acetaminophen 500 mg tablet Commonly known as:  TYLENOL What changed:   
- medication strength 
- how much to take - when to take this 
- reasons to take this Your last dose was: Your next dose is: Take 1 Tab by mouth every six (6) hours for 14 days. 500 mg CONTINUE taking these medications Instructions Each Dose to Equal  
 Morning Noon Evening Bedtime BACTROBAN 2 % ointment Generic drug:  mupirocin Your last dose was: Your next dose is:    
   
   
 Apply  to affected area two (2) times a day. Indications: used  twice day for 5 days for staph infection in nose completed on tuesday of last  week diclofenac EC 75 mg EC tablet Commonly known as:  VOLTAREN Your last dose was: Your next dose is: Take 75 mg by mouth two (2) times a day. 75 mg DULoxetine 60 mg capsule Commonly known as:  CYMBALTA Your last dose was: Your next dose is: Take 60 mg by mouth every morning. 60 mg  
    
   
   
   
  
 lisinopril-hydroCHLOROthiazide 20-12.5 mg per tablet Commonly known as:  Pamula Colla Your last dose was: Your next dose is: Take  by mouth every morning. VITAMIN D3 2,000 unit Tab Generic drug:  cholecalciferol (vitamin D3) Your last dose was: Your next dose is: Take  by mouth daily. STOP taking these medications   
 zolpidem 5 mg tablet Commonly known as:  AMBIEN Where to Get Your Medications Information on where to get these meds will be given to you by the nurse or doctor. ! Ask your nurse or doctor about these medications  
  acetaminophen 500 mg tablet HYDROcodone-acetaminophen 5-325 mg per tablet  
 polyethylene glycol 17 gram/dose powder  
 senna-docusate 8.6-50 mg per tablet

## 2018-06-18 NOTE — DISCHARGE INSTRUCTIONS
[address] [place] [address]    Discharge Instruction Sheet: Lumbar Spine Synovial Cyst Removal    DR. Kaur Overall    Pain control:  Typically, we will prescribe a narcotic usually 1-2 tabs every four hours is sufficient for the pain. Most patients need this only for the first few weeks. You should discontinue this as the pain decreases. You should not drive while taking any narcotic pain medications. Constipation  Pain medicines and anesthesia can be constipating-this can be prevented by gentle physical activity and drinking plenty of fluid. It should be treated with over-the-counter medications such as Miralax or suppositories, and/or Fleets enema. You should have a bowel movement at least every other day following surgery. Incision care  Keep this area clean and dry. Your dressing is designed to stay in place for 5-7 days. You will be sent home with one additional dressing to change at that time. Leave this new dressing in place until our follow up visit in the office in about 10-14 days. If staples are in place, they should be removed about 14-20 days after surgery. DO NOT take a tub bath or go swimming until cleared by your doctor. DO NOT apply lotions, oils, or creams to incision. Cover the wound with an impermiable dressing to shower for then next 5 days, then no cover is needed. To increase and promote healing:   Stop Smoking (or at least cut back on smoking).  Eat a well-balanced diet (high in protein and vitamin C)   If your appetite is poor, consider nutritional supplements like Ensure, Glucerna, or Arcata Instant Breakfast.   If you are diabetic, controlling you blood sugars is very important to prevent infection and promote wound healing. Nutrition:   If you were on a supplement such as Ensure or Glucerna) while in the hospital, please continue using them with each meal for the next 30 days.    Eat a well-balanced diet - High in protein, high in vitamins and minerals, especially vitamin C and zinc.     Restrictions:  Limited bending at waist  Lift no more than 10 pounds    Warning signs : Please call your physician immediately at [phone] if you have   Bleeding from incision that is constant.  Change in mental status (unusual behavior or confusion)   If your incision develops redness or swelling   Change in wound drainage (increase in amount, color, or foul odor)   Chazy over 101.5 degrees Fahrenheit    Headache that is not relieved with pain medication   Tenderness or redness in the calf of your leg    Emergency: CALL 911 if you have   Shortness of breath   Chest pain   Localized chest pain when coughing or taking a deep breath    Follow-up  Please call Dr. Kamron Howard office for a follow up appointment in 2 weeks at [phone]. You can return to work when cleared by a physician. During normal business hours you may reach Dr. Reina Beckwith' team directly at [phone] if you have concerns or questions.     Zully Molina

## 2018-06-18 NOTE — PERIOP NOTES
Patient: Ellis Newman MRN: [id number]  SSN: [id number]   YOB: 1950  Age: 74 y.o. Sex: female     Patient is status post Procedure(s):  LEFT L5-S1 SYNOVIAL CYST EXCISION (yordan nayak). Surgeon(s) and Role:     * Savanah Harden MD - Primary    Local/Dose/Irrigation:  100 ML DR. NUNEZ LOCAL INJECTION SOLUTION  LUMBAR INCISIONAL AREA. Peripheral IV 06/18/18 Left Wrist (Active)   Site Assessment Clean, dry, & intact 6/18/2018  1:00 PM   Phlebitis Assessment 0 6/18/2018  1:00 PM   Infiltration Assessment 0 6/18/2018  1:00 PM   Dressing Status Clean, dry, & intact 6/18/2018  1:00 PM   Dressing Type Tape;Transparent 6/18/2018  1:00 PM   Hub Color/Line Status Pink; Infusing 6/18/2018  1:00 PM                           Dressing/Packing:  Wound Back Lower-DRESSING TYPE: Sutures; Topical skin adhesive/glue (exofin fusion) (06/18/18 1500)  Splint/Cast:  ]    Other:  CHAVIRA CATHETER  REMOVED AT END OF CASE.

## 2018-06-18 NOTE — PERIOP NOTES
mihai- right and left temporal pulse palp. Smile and tongue pertrussion symmetrical right and left radial pulse palp. Grasp strong and equal.  Brisk cap refill right and left dp and pt pulse palp. Strong foot pulses. Brisk cap refill. Pt alert and oriented.

## 2018-06-18 NOTE — PERIOP NOTES
Handoff Report from Operating Room to PACU    Report received from 3 Tyree Dutton and Bebeto Eze CRNA regarding Nataly Danger. Surgeon(s):  Brian Turk MD  And Procedure(s) (LRB):  LEFT L5-S1 SYNOVIAL CYST EXCISION (yordan nayak) (Left)  confirmed   with allergies, dressings and local anesthetic discussed. Anesthesia type, drugs, patient history, complications, estimated blood loss, vital signs, intake and output, and last pain medication, lines, reversal medications and temperature were reviewed.

## 2018-06-18 NOTE — PROGRESS NOTES
Ortho/ NeuroSurgery NP Note    POD# 0  s/p LEFT L5-S1 SYNOVIAL CYST EXCISION (yordan sol)     Pt resting in bed. No complaints. Pain minimal but tolerable. Concerned about pain medication. Would rather take Norco than Oxycodone. VSS Afebrile. Patient has had something to eat. No nausea. Most Recent Labs:   Lab Results   Component Value Date/Time    HGB 12.1 06/04/2018 03:15 PM    Hemoglobin A1c 5.5 06/04/2018 03:15 PM       MRSA Screen Pre-op Negative Apparent staph noted on culture. Bactroban ordered per protocol. Patient cmopleted. U/A Screen Pre-Op Negative    Body mass index is 27.33 kg/(m^2). Reference: BMI greater than 30 is classified as obesity and greater than 40 is classified as morbid obesity. STOP BANG Score: 2  Incentive Spirometer Pre-Op Volume: 1500    Voiding status: Patient needs to void today. Dressing c.d.i    Calves soft and supple; No pain with passive stretch  Sensation and motor intact  SCDs for mechanical DVT proph    Plan:  1) PT BID starting today  2) Jesusita-op Antibiotics Ancef  3) Pepcid for GI Prophylaxis   4) Discharge plans to home with her     Xavier Das NP     Addendum:    Kevin Olsonk with patient and noticed some gait instability. She reflected this was not her meds but her \"head\" in just being slightly sedated from surgery. Her walking improved as she continued and she felt ready to go home. Discussed with Dr. Tami Dean and he is in agreement with discharge.       Xavier Das NP   6/18/2018  5:37 PM

## 2018-06-18 NOTE — PROGRESS NOTES
Patient discharged home in stable condition with her  & unit TA by wheelchair.  Discharged instruction & prescription given by Hayden Blanchard NP.

## 2018-06-18 NOTE — BRIEF OP NOTE
BRIEF OPERATIVE NOTE    Date of Procedure: 6/18/2018   Preoperative Diagnosis: LUMBAGO, RADICULOPATHY, SYNOVIAL CYST  Postoperative Diagnosis: * No post-op diagnosis entered *    Procedure(s):  LEFT L5-S1 SYNOVIAL CYST EXCISION (yordan nayak)  Surgeon(s) and Role:     * Yann Basurto MD - Primary         Surgical Assistant: Brenna Aviles    Surgical Staff:  Circ-1: Tristian Shoemaker RN  Circ-Intern: Mima Maria  Physician Assistant: Dawson Juarez  Radiology Technician: RT Irene  Scrub Tech-1: Trinidad Homans Miltner  Event Time In   Incision Start 1433   Incision Close      Anesthesia: General   Estimated Blood Loss: 20cc  Specimens: * No specimens in log *   Findings: stenosis   Complications: none  Implants: * No implants in log *

## 2018-06-18 NOTE — H&P
Progress notes     Expand All Collapse All       Subjective    Subjective:      Patient ID: Mac Armendariz is a 74 y.o. female.     Chief Complaint: Follow-up of the Lower Back        HPI:  Mac Armendariz is a 74 y.o. female with complaints of pain low back with radiation down the left lower extremity going posterior laterally all the way down to the foot. She denies symptoms on the right side. Symptoms are worsened with standing improved with sitting. She has tried conservative measures and medications with no significant relief. She is scheduled for surgery in June for facet cyst removal.  She is had difficult reaction with prednisone and is to sedated with the gabapentin. She has been taking Tylenol as well as over-the-counter ibuprofen. She has had epidural steroid injection x2 with no relief. It is rated 7 out of 10 on the VAS.       There are no active problems to display for this patient.           Current Outpatient Prescriptions:     busPIRone (BUSPAR) 5 MG tablet, Take 5 mg by mouth 3 (three) times a day., Disp: , Rfl:     calcium-vitamin D (OSCAL-500) 500-200 MG-UNIT per tablet, Take 1 tablet by mouth daily. , Disp: , Rfl:     lisinopril-hydrochlorothiazide (PRINZIDE,ZESTORETIC) 20-12.5 MG per tablet, Take 1 tablet by mouth once daily. , Disp: , Rfl: 0    zolpidem (AMBIEN) 10 MG tablet, TAKE ONE TAB BY MOUTH EVERY NIGHT AT BEDTIME, Disp: , Rfl: 2          Allergies   Allergen Reactions    Codeine Itching    Erythromycin Nausea Only         ROS:   No new bowel or bladder incontinence. No fever. No saddle anesthesia.              Objective    Objective: There were no vitals filed for this visit.     Body mass index is 27.21 kg/m². , a BMI over 30 is considered obese and a BMI over 40 has been associated with a higher risk of surgical complications.     Constitutional: No acute distress. Well nourished. HEENT: Normocephalic. Respiratory:  No labored breathing.   Cardiovascular:  No marked cyanosis. Skin:  No marked skin ulcers/lesions on bilateral upper or lower extremities. Psychiatric: Alert and oriented x3. Inspection: No gross deformity of bilateral upper or lower extremities. Musculoskeletal/Neurological:   Gait/balance:  - Normal. Able to walk on heels and toes. Thoracolumbar spine:  - No tenderness to palpation  - Full range of motion. Right lower extremity:  - No tenderness to palpation   - Full range of motion  - No pain with internal/external rotation of the hip  - Strength:  - 5 out of 5 to hip flexors  - 5 out of 5 to quads  - 5 out of 5 to TA  - 5 out of 5 to EHL  - 5 out of 5 to Gastroc/Soleus  - Negative straight leg raise  Left lower extremity:  - No tenderness to palpation   - Full range of motion  - No pain with internal/external rotation of the hip  - Strength:  - 5 out of 5 to hip flexors  - 5 out of 5 to quads  - 5 out of 5 to TA  - 5 out of 5 to EHL  - 5 out of 5 to Gastroc/Soleus  - Negative straight leg raise  Sensation:  - Intact to light touch  Reflexes:  - +2 Patellar tendon   - +2 Achilles tendon         Radiographs:       No imaging obtained         Assessment    Assessment:          ICD-10-CM   1. Left sided sciatica M54.32   2. Low back pain, unspecified back pain laterality, unspecified chronicity, with sciatica presence unspecified M54.5   3. Osteoarthritis of spine with radiculopathy, lumbar region M47.26   4. Synovial cyst of lumbar facet joint M71.38               Plan    Plan:      At this point she has failed conservative management for her issue.  We have discussed surgical management which wouild involve a L5-S1 synovial cyst removal. I have discussed the procedure in detail with the patient and mentioned complications, including but not limited to: death, permanent disability, heart attack, stroke, lung injury or infection, blindness, ileus, bladder or bowel problems, ureter injury, bleeding, nerve injury (including numbness, pain and weakness), paralysis (which may be permanent), failure to heal, failure to fuse bone together in fusion procedures, failure to relief symptoms, failure to relief pain, increased pain, need for further surgeries, failure or breakage or hardware, malpositioning of hardware, need to fuse or operate on additional levels determined either during or after surgery, destabilization of the spine (which may require fusion or later surgery), infections (which may or may not require additional surgery), dural tears (tears of the sac holding in nerves and spinal fluid), meningitis, voice changes, vocal cord injury, hoarseness, blood clots, pulmonary embolus, Susana syndrome, recurrent disc herniation, diaphragm paralysis, and anesthetic complications. Comorbidities such as obesity, smoking, rheumatoid arthritis, chronic steroid use and diabetes increase these risks. The patient understands and wants to proceed. We will have her visit with our bracing specialist for LSO brace. Orders Placed This Encounter    BMI >=25 PATIENT INSTRUCTIONS & EDUCATION      Return for Post-Op Check.            Teetee Long PA-C     This note has been transcribed electronically using voice recognition and a trained scribe. It is believed to be accurate, but may contain errors secondary to technological limitations and other factors.

## 2018-06-18 NOTE — ANESTHESIA POSTPROCEDURE EVALUATION
Post-Anesthesia Evaluation and Assessment    Patient: Earl Gambino MRN: [id number]  SSN: [id number]    YOB: 1950  Age: 74 y.o. Sex: female       Cardiovascular Function/Vital Signs  Visit Vitals    /63    Pulse 83    Temp 36.5 °C (97.7 °F)    Resp 15    Ht 5' 1\" (1.549 m)    Wt 65.6 kg (144 lb 10 oz)    SpO2 97%    BMI 27.33 kg/m2       Patient is status post general anesthesia for Procedure(s):  LEFT L5-S1 SYNOVIAL CYST EXCISION (yordan nayak). Nausea/Vomiting: None    Postoperative hydration reviewed and adequate. Pain:  Pain Scale 1: Numeric (0 - 10) (06/18/18 1600)  Pain Intensity 1: 0 (06/18/18 1600)   Managed    Neurological Status:   Neuro (WDL): Exceptions to WDL (06/18/18 1518)  Neuro  Neurologic State: Eyes open to voice; Lethargic (06/18/18 1518)  Orientation Level: Oriented to person;Oriented to situation (06/18/18 1518)  Cognition: Follows commands (06/18/18 1518)  Speech: Delayed responses; Appropriate for age (06/18/18 1518)  Assessment L Pupil: Deferred (06/18/18 1219)  Assessment R Pupil: Deferred (06/18/18 1219)  LUE Motor Response: Purposeful (06/18/18 1518)  LLE Motor Response: Purposeful (06/18/18 1518)  RUE Motor Response: Purposeful (06/18/18 1518)  RLE Motor Response: Purposeful (06/18/18 1518)   At baseline    Mental Status and Level of Consciousness: Arousable    Pulmonary Status:   O2 Device: Nasal cannula (06/18/18 1600)   Adequate oxygenation and airway patent    Complications related to anesthesia: None    Post-anesthesia assessment completed.  No concerns    Signed By: Claudette Mortimer, MD     June 18, 2018

## 2018-06-18 NOTE — DISCHARGE SUMMARY
Spine Discharge Summary    Patient ID:  Killian Roldan  [id number]  female  74 y.o.  1950    Admit date: 6/18/2018    Discharge date: 6/18/2018    Admitting Physician: Zenovia Phoenix, MD     Consulting Physician(s):   Treatment Team: Attending Provider: Zenovia Phoenix, MD; Utilization Review: Kareem Cordero RN; Nurse Practitioner: Angelika Desai NP    Date of Surgery:   6/18/2018     Preoperative Diagnosis:  LUMBAGO, RADICULOPATHY, SYNOVIAL CYST    Postoperative Diagnosis:   LUMBAGO, RADICULOPATHY, SYNOVIAL CYST    Procedure(s):  LEFT L5-S1 SYNOVIAL CYST EXCISION (yordan nayak)     Anesthesia Type:   General     Surgeon: Zenovia Phoenix, MD                            HPI:  Pt is a 74 y.o. female who has a history of LUMBAGO, RADICULOPATHY, SYNOVIAL CYST  with pain and limitations of activities of daily living who presents at this time for a L. L5-S1 Synovial Cyst Removal following the failure of conservative management. PMH:   Past Medical History:   Diagnosis Date    Arthritis     Chronic pain     back    GERD (gastroesophageal reflux disease)     Hypertension     Nausea & vomiting     Psychiatric disorder     anxiety    Unspecified adverse effect of anesthesia     hard to wake up       Body mass index is 27.33 kg/(m^2). BMI greater than 30 is classified as obesity. Medications upon admission :   Prior to Admission Medications   Prescriptions Last Dose Informant Patient Reported? Taking? DULoxetine (CYMBALTA) 60 mg capsule 6/18/2018 at 0930  Yes Yes   Sig: Take 60 mg by mouth every morning. cholecalciferol, vitamin D3, (VITAMIN D3) 2,000 unit tab 6/17/2018 at 0830   Yes No   Sig: Take  by mouth daily. diclofenac EC (VOLTAREN) 75 mg EC tablet 6/11/2018  Yes No   Sig: Take 75 mg by mouth two (2) times a day. lisinopril-hydrochlorothiazide (PRINZIDE, ZESTORETIC) 20-12.5 mg per tablet 6/17/2018 at 0830  Yes Yes   Sig: Take  by mouth every morning.    mupirocin (BACTROBAN) 2 % ointment 6/18/2018  Yes Yes   Sig: Apply  to affected area two (2) times a day. Indications: used  twice day for 5 days for staph infection in nose completed on tuesday of last  week   zolpidem (AMBIEN) 5 mg tablet 6/17/2018 at 2200  Yes Yes   Sig: Take  by mouth nightly. Facility-Administered Medications: None        Allergies: Allergies   Allergen Reactions    Codeine Itching    Erythromycin Nausea Only        Hospital Course: The patient underwent surgery. Intra-operative complications: None; patient tolerated the procedure well. Was taken to the PACU in stable condition and then transferred to the ortho floor. Perioperative Antibiotics:  Ancef     Postoperative Pain Management:  Norco    Postoperative transfusions:    Number of units banked PRBCs =   none     Post Op complications: None     Hemoglobin at discharge:    Lab Results   Component Value Date/Time    HGB 12.1 06/04/2018 03:15 PM    INR 1.0 06/04/2018 03:15 PM       Dressing  - clean, dry and intact. No significant erythema or swelling. Neurovascular exam found to be within normal limits. Wound appears to be healing without any evidence of infection. Patient mobilized with nursing and was found to be safe and steady with ambulation. Discharged to: Home    Condition on Discharge:   stable    Discharge instructions:    - Take pain medications as prescribed  - Resume pre hospital diet      - Discharge activity: activity as tolerated  - Ambulate as tolerated  - Wound Care Keep wound clean and dry. See discharge instruction sheet. -DISCHARGE MEDICATION LIST     Current Discharge Medication List      START taking these medications    Details   HYDROcodone-acetaminophen (NORCO) 5-325 mg per tablet Take 1-2 Tabs by mouth every four (4) hours as needed for Pain. Max Daily Amount: 12 Tabs. Use caution when taking medications containing tylenol.   Qty: 80 Tab, Refills: 0    Associated Diagnoses: Synovial cyst of lumbar spine polyethylene glycol (MIRALAX) 17 gram/dose powder Take 17 g by mouth daily for 15 days. Qty: 255 g, Refills: 0      senna-docusate (SENNA PLUS) 8.6-50 mg per tablet Take 1 Tab by mouth two (2) times a day. Qty: 60 Tab, Refills: 0         CONTINUE these medications which have CHANGED    Details   acetaminophen (TYLENOL) 500 mg tablet Take 1 Tab by mouth every six (6) hours for 14 days. Qty: 56 Tab, Refills: 0         CONTINUE these medications which have NOT CHANGED    Details   mupirocin (BACTROBAN) 2 % ointment Apply  to affected area two (2) times a day. Indications: used  twice day for 5 days for staph infection in nose completed on tuesday of last  week      DULoxetine (CYMBALTA) 60 mg capsule Take 60 mg by mouth every morning. lisinopril-hydrochlorothiazide (PRINZIDE, ZESTORETIC) 20-12.5 mg per tablet Take  by mouth every morning. cholecalciferol, vitamin D3, (VITAMIN D3) 2,000 unit tab Take  by mouth daily. diclofenac EC (VOLTAREN) 75 mg EC tablet Take 75 mg by mouth two (2) times a day.          STOP taking these medications       zolpidem (AMBIEN) 5 mg tablet Comments:   Reason for Stopping:            per medical continuation form      -Follow up in office in 2 weeks      Signed:  Mic Maddox  MSN, APRN, FNP-C, [facility]  Orthopaedic/Neurosurgery Nurse Practitioner    6/18/2018  5:44 PM

## 2018-06-18 NOTE — ANESTHESIA PREPROCEDURE EVALUATION
Anesthetic History   No history of anesthetic complications  PONV          Review of Systems / Medical History  Patient summary reviewed, nursing notes reviewed and pertinent labs reviewed    Pulmonary          Smoker      Comments: Former smoker   Neuro/Psych         Psychiatric history    Comments: anxiety Cardiovascular  Within defined limits  Hypertension: well controlled              Exercise tolerance: >4 METS     GI/Hepatic/Renal     GERD: well controlled      Hiatal hernia     Endo/Other        Arthritis     Other Findings   Comments: Chronic pain            Physical Exam    Airway  Mallampati: I  TM Distance: > 6 cm  Neck ROM: normal range of motion   Mouth opening: Normal     Cardiovascular    Rhythm: regular  Rate: normal      Pertinent negatives: No murmur   Dental    Dentition: Caps/crowns, Upper dentition intact and Lower dentition intact     Pulmonary  Breath sounds clear to auscultation               Abdominal  GI exam deferred       Other Findings            Anesthetic Plan    ASA: 2  Anesthesia type: general    Monitoring Plan: BIS      Induction: Intravenous  Anesthetic plan and risks discussed with: Patient

## 2018-06-18 NOTE — IP AVS SNAPSHOT
[address] [facility] 
[phone] Patient: Zully Molina MRN: [id number] WQF:[id number] About your hospitalization You were admitted on:  June 18, 2018 You last received care in the:  [facility] ORTHOPEDICS You were discharged on:  June 18, 2018 Why you were hospitalized Your primary diagnosis was:  Not on File Your diagnoses also included:  Synovial Cyst Of Lumbar Spine Follow-up Information Follow up With Details Comments Contact Info Juli Valiente MD Schedule an appointment as soon as possible for a visit in 2 weeks  [address] [facility] [address] [facility] 
[phone] Nathaniel Sandoval MD   [address] [facility] 
[phone] Discharge Orders None A check kevin indicates which time of day the medication should be taken. My Medications START taking these medications Instructions Each Dose to Equal  
 Morning Noon Evening Bedtime HYDROcodone-acetaminophen 5-325 mg per tablet Commonly known as:  Bhavya Caballero Your last dose was: Your next dose is: Take 1-2 Tabs by mouth every four (4) hours as needed for Pain. Max Daily Amount: 12 Tabs. Use caution when taking medications containing tylenol. 1-2 Tab  
    
   
   
   
  
 polyethylene glycol 17 gram/dose powder Commonly known as:  Torrey Kinza Your last dose was: Your next dose is: Take 17 g by mouth daily for 15 days. 17 g  
    
   
   
   
  
 senna-docusate 8.6-50 mg per tablet Commonly known as:  SENNA PLUS Your last dose was: Your next dose is: Take 1 Tab by mouth two (2) times a day. 1 Tab CHANGE how you take these medications Instructions Each Dose to Equal  
 Morning Noon Evening Bedtime  
 acetaminophen 500 mg tablet Commonly known as:  TYLENOL  
 What changed:   
- medication strength 
- how much to take - when to take this 
- reasons to take this Your last dose was: Your next dose is: Take 1 Tab by mouth every six (6) hours for 14 days. 500 mg CONTINUE taking these medications Instructions Each Dose to Equal  
 Morning Noon Evening Bedtime BACTROBAN 2 % ointment Generic drug:  mupirocin Your last dose was: Your next dose is:    
   
   
 Apply  to affected area two (2) times a day. Indications: used  twice day for 5 days for staph infection in nose completed on tuesday of last  week  
     
   
   
   
  
 diclofenac EC 75 mg EC tablet Commonly known as:  VOLTAREN Your last dose was: Your next dose is: Take 75 mg by mouth two (2) times a day. 75 mg DULoxetine 60 mg capsule Commonly known as:  CYMBALTA Your last dose was: Your next dose is: Take 60 mg by mouth every morning. 60 mg  
    
   
   
   
  
 lisinopril-hydroCHLOROthiazide 20-12.5 mg per tablet Commonly known as:  Susan Dun Your last dose was: Your next dose is: Take  by mouth every morning. VITAMIN D3 2,000 unit Tab Generic drug:  cholecalciferol (vitamin D3) Your last dose was: Your next dose is: Take  by mouth daily. STOP taking these medications   
 zolpidem 5 mg tablet Commonly known as:  AMBIEN Where to Get Your Medications Information on where to get these meds will be given to you by the nurse or doctor. ! Ask your nurse or doctor about these medications  
  acetaminophen 500 mg tablet HYDROcodone-acetaminophen 5-325 mg per tablet  
 polyethylene glycol 17 gram/dose powder  
 senna-docusate 8.6-50 mg per tablet Opioid Education Prescription Opioids: What You Need to Know: 
 
 
DR. Malini Oswald Pain control: 
Typically, we will prescribe a narcotic usually 1-2 tabs every four hours is sufficient for the pain. Most patients need this only for the first few weeks. You should discontinue this as the pain decreases. You should not drive while taking any narcotic pain medications. Constipation Pain medicines and anesthesia can be constipating-this can be prevented by gentle physical activity and drinking plenty of fluid. It should be treated with over-the-counter medications such as Miralax or suppositories, and/or Fleets enema. You should have a bowel movement at least every other day following surgery. Incision care Keep this area clean and dry. Your dressing is designed to stay in place for 5-7 days. You will be sent home with one additional dressing to change at that time. Leave this new dressing in place until our follow up visit in the office in about 10-14 days. If staples are in place, they should be removed about 14-20 days after surgery. DO NOT take a tub bath or go swimming until cleared by your doctor. DO NOT apply lotions, oils, or creams to incision. Cover the wound with an impermiable dressing to shower for then next 5 days, then no cover is needed. To increase and promote healing: 
? Stop Smoking (or at least cut back on smoking). ? Eat a well-balanced diet (high in protein and vitamin C) ?  If your appetite is poor, consider nutritional supplements like Ensure, Glucerna, or Mandaree Instant Breakfast. 
 ? If you are diabetic, controlling you blood sugars is very important to prevent infection and promote wound healing. Nutrition: ? If you were on a supplement such as Ensure or Glucerna) while in the hospital, please continue using them with each meal for the next 30 days. ? Eat a well-balanced diet - High in protein, high in vitamins and minerals, especially vitamin C and zinc.  
 
Restrictions: 
Limited bending at waist 
Lift no more than 10 pounds Warning signs : Please call your physician immediately at [phone] if you have ? Bleeding from incision that is constant. ? Change in mental status (unusual behavior or confusion) ? If your incision develops redness or swelling 
? Change in wound drainage (increase in amount, color, or foul odor) ? [place] over 101.5 degrees Fahrenheit  
? Headache that is not relieved with pain medication ? Tenderness or redness in the calf of your leg Emergency: CALL 911 if you have ? Shortness of breath ? Chest pain ? Localized chest pain when coughing or taking a deep breath Follow-up Please call Dr. Justyn Alvarez office for a follow up appointment in 2 weeks at [phone]. You can return to work when cleared by a physician. During normal business hours you may reach Dr. Naomi Mckeon' team directly at [phone] if you have concerns or questions. Ellis Newman InGaugeIt Announcement We are excited to announce that we are making your provider's discharge notes available to you in InGaugeIt. You will see these notes when they are completed and signed by the physician that discharged you from your recent hospital stay. If you have any questions or concerns about any information you see in InGaugeIt, please call the Health Information Department where you were seen or reach out to your Primary Care Provider for more information about your plan of care. Introducing [facility]!    
 [employer] introduces InGaugeIt patient portal. Now you can access parts of your medical record, email your doctor's office, and request medication refills online. 1. In your internet browser, go to [email or web address] 2. Click on the First Time User? Click Here link in the Sign In box. You will see the New Member Sign Up page. 3. Enter your Scoopinion Access Code exactly as it appears below. You will not need to use this code after youve completed the sign-up process. If you do not sign up before the expiration date, you must request a new code. · Scoopinion Access Code: [email or web address] Expires: 9/16/2018  6:56 PM 
 
4. Enter the last four digits of your Social Security Number (xxxx) and Date of Birth (mm/dd/yyyy) as indicated and click Submit. You will be taken to the next sign-up page. 5. Create a Scoopinion ID. This will be your Scoopinion login ID and cannot be changed, so think of one that is secure and easy to remember. 6. Create a Scoopinion password. You can change your password at any time. 7. Enter your Password Reset Question and Answer. This can be used at a later time if you forget your password. 8. Enter your e-mail address. You will receive e-mail notification when new information is available in [address] 9. Click Sign Up. You can now view and download portions of your medical record. 10. Click the Download Summary menu link to download a portable copy of your medical information. If you have questions, please visit the Frequently Asked Questions section of the Scoopinion website. Remember, Scoopinion is NOT to be used for urgent needs. For medical emergencies, dial 911. Now available from your iPhone and Android! Introducing Chris Judd As a Ry Rodriguez patient, I wanted to make you aware of our electronic visit tool called Chris Judd. [facility] 24/7 allows you to connect within minutes with a medical provider 24 hours a day, seven days a week via a mobile device or tablet or logging into a secure website from your computer. You can access [employer] from anywhere in the [place]. A virtual visit might be right for you when you have a simple condition and feel like you just dont want to get out of bed, or cant get away from work for an appointment, when your regular Baldemar Garsia provider is not available (evenings, weekends or holidays), or when youre out of town and need minor care. Electronic visits cost only $49 and if the [facility] 24/[facility] provider determines a prescription is needed to treat your condition, one can be electronically transmitted to a nearby pharmacy*. Please take a moment to enroll today if you have not already done so. The enrollment process is free and takes just a few minutes. To enroll, please download the [employer] hanh to your tablet or phone, or visit [email or web address] to enroll on your computer. And, as an [address] [place] [address] patient with a CultureMap account, the results of your visits will be scanned into your electronic medical record and your primary care provider will be able to view the scanned results. We urge you to continue to see your regular Baldemar Garsia provider for your ongoing medical care. And while your primary care provider may not be the one available when you seek a Chris Thomasfin virtual visit, the peace of mind you get from getting a real diagnosis real time can be priceless. For more information on Chris Quantifeedbowenfin, view our Frequently Asked Questions (FAQs) at [email or web address]. Sincerely, 
 
Lee Jose MD 
Chief Medical Officer [employer] *:  certain medications cannot be prescribed via HemoteqbowenCounterStorm Unresulted tests-please follow up with your PCP on these results Procedure/Test Authorizing Provider  XR FLUOROSCOPY OVER 61 MINUTES Juany Jones MD  
 XR SPINE SNGL V (CROSS TABLE LAT) Juany Jones MD  
  
 Providers Seen During Your Hospitalization Provider Specialty Primary office phone Darell Ashley MD Orthopedic Surgery [phone] Your Primary Care Physician (PCP) Primary Care Physician Office Phone Office Mathew Whitley Rodriguez [phone] You are allergic to the following Allergen Reactions Codeine Itching Erythromycin Nausea Only Recent Documentation Height Weight Breastfeeding? BMI OB Status Smoking Status 1.549 m 65.6 kg No 27.33 kg/m2 Hysterectomy Former Smoker Emergency Contacts Name Discharge Info Relation Home Work Kadeem Sky CAREGIVER [3] Spouse [3] [phone] Patient Belongings The following personal items are in your possession at time of discharge: 
  Dental Appliances: None  Visual Aid: Glasses      Home Medications: None   Jewelry: Ring (ring to  by pt )  Clothing: Pants, Shirt, Undergarments, Footwear    Other Valuables: None Please provide this summary of care documentation to your next provider. Signatures-by signing, you are acknowledging that this After Visit Summary has been reviewed with you and you have received a copy. Patient Signature:  ____________________________________________________________ Date:  ____________________________________________________________  
  
Yazmin Kilpatrick Provider Signature:  ____________________________________________________________ Date:  ____________________________________________________________

## 2018-06-18 NOTE — OP NOTES
[facility]  OPERATIVE REPORT    Name:Allen LANIER  MR#: [id number]  : 1950  ACCOUNT #: [de-identified]   DATE OF SERVICE: 2018    PREOPERATIVE DIAGNOSES:  1. Synovial cyst on the left at L5-S1.  2.  Spinal stenosis. 3.  Lumbago. 4.  Sciatica. POSTOPERATIVE DIAGNOSES:    1. Synovial cyst on the left at L5-S1.  2.  Spinal stenosis. 3.  Lumbago. 4.  Sciatica. PROCEDURES PERFORMED:  1. L5-S1 laminectomy. 2.  Removal of intraspinal extradural lesion. SURGEON:  Paola Justice MD     FIRST ASSISTANT:  Deepti Yee PA-C. ANESTHESIA: General endotracheal.     ESTIMATED BLOOD LOSS:  25 mL. COMPLICATIONS:  None. SPECIMENS:  None. DRAINS:  None. IMPLANTS:  None. INDICATIONS OF PROCEDURE:  The patient is a pleasant 33-year-old lady with lumbar spinal stenosis, a facet synovial cyst who has failed to improve with nonoperative treatment. At this point, she elected to proceed with surgical intervention. We have given her warnings about the possible complications including death, paralysis, blindness, stroke. She understands and wished to proceed. DESCRIPTION OF PROCEDURE:  Informed consent was obtained, the operative site was properly marked. The patient was moved back to the operating room and underwent general endotracheal anesthesia. She was positioned prone on the operating table using the [employer] frame. Arms were placed in the 90/90 position. These were gently bent on pillows. Fluoroscopy was used to kevin the level of the incision. We then proceeded to prep and drape in the usual manner. Timeout was obtained verifying this as the correct patient, the correct surgery, the correct site as well as she had received receive antibiotics within 30 minutes of the incision. I then proceeded to perform a standard posterior approach to the lumbar spine on the left side exposing the L5-S1.  Once the area was exposed and hemostasis obtained fluoroscopy was used to verify the correct level. I proceeded to bring the operative microscope it keeping placed for the remainder of the procedure. I then proceeded to use a [place] [place]  Under microscopic visualization to perform a partial laminectomy at L5, a J-cut at S1. Intervening bone was removed with a pituitary. The ligamentum flavum was detached from its superior leading edge with a curved curet. The ligamentum flavum was flipped into the defect and removed with a Kerrison #1 followed by Kerrison #2 to fully decompress the thecal sac centrally. There was significant adherence on the lateral recess where the synovial cyst was. Gentle teasing with the Phyliss Po #4 allowed us to separate the synovial cyst from the thecal sac and it was then removed with the Kerrison #3 and #4. The nerve root was fully decompressed and was noted to be erythematous anterior to the amorphous mass. Once that was completed I irrigated the wound with 3 L of normal saline, verified that hemostasis was obtained. I then proceeded to close the fascia with #1 Vicryl figure-of-eight interrupted sutures, followed by irrigating the subcu, closed subcutaneous with 2-0 Vicryl and the skin with 3-0 running Monocryl and Dermabond. Sterile dressing was applied. The patient was then awakened and transferred to PACU in stable condition. POSTOPERATIVE PLAN:  The patient is going to remain here overnight. We are going to use YOLA hose for DVT prophylaxis and Ancef for infection prophylaxis.       MD IGNACIO Talamantes /   D: 06/18/2018 15:09     T: 06/18/2018 15:49  JOB #: [id number]

## 2018-10-24 ENCOUNTER — HOSPITAL ENCOUNTER (OUTPATIENT)
Dept: MRI IMAGING | Age: 68
Discharge: HOME | End: 2018-10-24
Attending: ORTHOPAEDIC SURGERY
Payer: MEDICARE

## 2018-10-24 DIAGNOSIS — M47.26 OSTEOARTHRITIS OF SPINE WITH RADICULOPATHY, LUMBAR REGION: ICD-10-CM

## 2018-10-24 PROCEDURE — 72158 MRI LUMBAR SPINE W/O & W/DYE: CPT

## 2018-10-24 PROCEDURE — A9575 INJ GADOTERATE MEGLUMI 0.1ML: HCPCS | Performed by: ORTHOPAEDIC SURGERY

## 2018-10-24 PROCEDURE — 74011250636 HC RX REV CODE- 250/636: Performed by: ORTHOPAEDIC SURGERY

## 2018-10-24 RX ORDER — GADOTERATE MEGLUMINE 376.9 MG/ML
13 INJECTION INTRAVENOUS
Status: COMPLETED | OUTPATIENT
Start: 2018-10-24 | End: 2018-10-24

## 2018-10-24 RX ADMIN — GADOTERATE MEGLUMINE 15 ML: 376.9 INJECTION INTRAVENOUS at 17:25

## 2019-01-23 RX ORDER — ZOLPIDEM TARTRATE 5 MG/1
5 TABLET ORAL
COMMUNITY

## 2019-01-23 NOTE — PERIOP NOTES
[facility] [place] Ambulatory Surgery Unit Pre-operative Instructions for Endo Procedures Procedure Date  1/30/19         Tentative Arrival Time 9444 1. On the day of your procedure, please report to the Ambulatory Surgery Unit Registration Desk and sign in at your designated time. The Ambulatory Surgery Unit is located in [facility] on the [facility] side of the hospitals across from the [address] [place] [address]. Please have all of your health insurance cards and a photo ID. 2. You must have someone with you to drive you home, as you should not drive a car for 24 hours following anesthesia. Please make arrangements for a responsible adult friend or family member to stay with you for at least the first 24 hours after your procedure. 3. Do not have anything to eat or drink (including water, gum, mints, coffee, juice) after 11:59 PM 1/29/19. This may not apply to medications prescribed by your physician. (Please note below the special instructions with medications to take the morning of your procedure.) 4. If applicable, follow the clear liquid diet and bowel prep instructions provided by your physician's office. If you do not have this information, or have any questions, please contact your physician's office. 5. We recommend you do not drink any alcoholic beverages for 24 hours before and after your procedure. 6. Contact your surgeons office for instructions on the following medications: non-steroidal anti-inflammatory drugs (i.e. Advil, Aleve), vitamins, and supplements. (Some surgeons will want you to stop these medications prior to surgery and others may allow you to take them) **If you are currently taking Plavix, Coumadin, Aspirin and/or other blood-thinning agents, contact your surgeon for instructions. ** Your surgeon will partner with the physician prescribing these medications to determine if it is safe to stop or if you need to continue taking.  Please do not stop taking these medications without instructions from your surgeon. 7. In an effort to help prevent surgical site infection, we ask that you shower with an anti-bacterial soap (i.e. Dial or Safeguard) on the morning of your procedure. Do not apply any lotions, powders, or deodorants after showering. 8. Wear comfortable clothes. Wear glasses instead of contacts. Do not bring any jewelry or money (other than copays or fees as instructed). Do not wear make-up, particularly mascara, the morning of your procedure. Wear your hair loose or down, no ponytails, buns, jaodn pins or clips. All body piercings must be removed. 9. You should understand that if you do not follow these instructions your procedure may be cancelled. If your physical condition changes (i.e. fever, cold or flu) please contact your surgeon as soon as possible. 10. It is important that you be on time. If a situation occurs where you may be late, or if you have any questions or problems, please call [phone]. 11. Your procedure time may be subject to change. You will receive a phone call the day prior to confirm your arrival time. Special Instructions: Take all medications and inhalers, as prescribed, on the morning of surgery with a sip of water EXCEPT:  
 
 
Insulin Dependent Diabetic patients: Take your diabetic medications as prescribed the day before surgery. Hold all diabetic medications the day of surgery. If you are scheduled to arrive for surgery after 8:00 AM, and your AM blood sugar is >200, please call Ambulatory Surgery. I understand a pre-operative phone call will be made to verify my procedure time. In the event that I am not available, I give permission for a message to be left on my answering service and/or with another person? YES The above note was reviewed with patient during PAT phone call on 1/23/19, patient verbalized understanding. ___________________      ___________________      ___________________ 
(Signature of Patient)          (Witness)                   (Date and Time)

## 2019-01-29 ENCOUNTER — OFFICE VISIT (OUTPATIENT)
Dept: NEUROLOGY | Age: 69
End: 2019-01-29

## 2019-01-29 ENCOUNTER — ANESTHESIA EVENT (OUTPATIENT)
Dept: SURGERY | Age: 69
End: 2019-01-29
Payer: MEDICARE

## 2019-01-29 VITALS
HEART RATE: 79 BPM | WEIGHT: 146 LBS | DIASTOLIC BLOOD PRESSURE: 82 MMHG | SYSTOLIC BLOOD PRESSURE: 162 MMHG | RESPIRATION RATE: 26 BRPM | OXYGEN SATURATION: 98 % | HEIGHT: 61 IN | BODY MASS INDEX: 27.56 KG/M2

## 2019-01-29 DIAGNOSIS — M79.605 LEFT LEG PAIN: ICD-10-CM

## 2019-01-29 DIAGNOSIS — Z98.890 HISTORY OF LUMBAR LAMINECTOMY: ICD-10-CM

## 2019-01-29 DIAGNOSIS — M54.16 LUMBAR BACK PAIN WITH RADICULOPATHY AFFECTING LEFT LOWER EXTREMITY: Primary | ICD-10-CM

## 2019-01-29 RX ORDER — IBUPROFEN 200 MG
TABLET ORAL
COMMUNITY
End: 2019-01-30

## 2019-01-29 RX ORDER — LAMOTRIGINE 25 MG/1
25 TABLET ORAL 2 TIMES DAILY
Qty: 100 TAB | Refills: 11 | Status: SHIPPED | OUTPATIENT
Start: 2019-01-29

## 2019-01-29 RX ORDER — ACETAMINOPHEN 325 MG/1
TABLET ORAL
COMMUNITY

## 2019-01-29 NOTE — PATIENT INSTRUCTIONS
A Healthy Lifestyle: Care Instructions Your Care Instructions A healthy lifestyle can help you feel good, stay at a healthy weight, and have plenty of energy for both work and play. A healthy lifestyle is something you can share with your whole family. A healthy lifestyle also can lower your risk for serious health problems, such as high blood pressure, heart disease, and diabetes. You can follow a few steps listed below to improve your health and the health of your family. Follow-up care is a key part of your treatment and safety. Be sure to make and go to all appointments, and call your doctor if you are having problems. It's also a good idea to know your test results and keep a list of the medicines you take. How can you care for yourself at home? · Do not eat too much sugar, fat, or fast foods. You can still have dessert and treats now and then. The goal is moderation. · Start small to improve your eating habits. Pay attention to portion sizes, drink less juice and soda pop, and eat more fruits and vegetables. ? Eat a healthy amount of food. A 3-ounce serving of meat, for example, is about the size of a deck of cards. Fill the rest of your plate with vegetables and whole grains. ? Limit the amount of soda and sports drinks you have every day. Drink more water when you are thirsty. ? Eat at least 5 servings of fruits and vegetables every day. It may seem like a lot, but it is not hard to reach this goal. A serving or helping is 1 piece of fruit, 1 cup of vegetables, or 2 cups of leafy, raw vegetables. Have an apple or some carrot sticks as an afternoon snack instead of a candy bar. Try to have fruits and/or vegetables at every meal. 
· Make exercise part of your daily routine. You may want to start with simple activities, such as walking, bicycling, or slow swimming. Try to be active 30 to 60 minutes every day.  You do not need to do all 30 to 60 minutes all at once. For example, you can exercise 3 times a day for 10 or 20 minutes. Moderate exercise is safe for most people, but it is always a good idea to talk to your doctor before starting an exercise program. 
· Keep moving. Caleen Newer the lawn, work in the garden, or [employer]. Take the stairs instead of the elevator at work. · If you smoke, quit. People who smoke have an increased risk for heart attack, stroke, cancer, and other lung illnesses. Quitting is hard, but there are ways to boost your chance of quitting tobacco for good. ? Use nicotine gum, patches, or lozenges. ? Ask your doctor about stop-smoking programs and medicines. ? Keep trying. In addition to reducing your risk of diseases in the future, you will notice some benefits soon after you stop using tobacco. If you have shortness of breath or asthma symptoms, they will likely get better within a few weeks after you quit. · Limit how much alcohol you drink. Moderate amounts of alcohol (up to 2 drinks a day for men, 1 drink a day for women) are okay. But drinking too much can lead to liver problems, high blood pressure, and other health problems. Family health If you have a family, there are many things you can do together to improve your health. · Eat meals together as a family as often as possible. · Eat healthy foods. This includes fruits, vegetables, lean meats and dairy, and whole grains. · Include your family in your fitness plan. Most people think of activities such as jogging or tennis as the way to fitness, but there are many ways you and your family can be more active. Anything that makes you breathe hard and gets your heart pumping is exercise. Here are some tips: 
? Walk to do errands or to take your child to school or the bus. 
? Go for a family bike ride after dinner instead of watching TV. Where can you learn more? Go to http://kassy-irena.info/. Enter K592 in the search box to learn more about \"A Healthy Lifestyle: Care Instructions. \" Current as of: September 11, 2018 Content Version: 11.9 © 8165-7341 [employer]. Care instructions adapted under license by [employer] (which disclaims liability or warranty for this information). If you have questions about a medical condition or this instruction, always ask your healthcare professional. [address] any warranty or liability for your use of this information. Office Policieso Phone calls/patient messages: Please allow up to 24 hours for someone in the office to contact you about your call or message. Be mindful your provider may be out of the office or your message may require further review. We encourage you to use [employer] for your messages as this is a faster, more efficient way to communicate with our office 
o Medication Refills: 
Prescription medications require up to 48 business hours to process. We encourage you to use [employer] for your refills. For controlled medications: Please allow up to 72 business hours to process. Certain medications may require you to  a written prescription at our office. NO narcotic/controlled medications will be prescribed after 4pm Monday through Friday or on weekends 
o Form/Paperwork Completion: We ask that you allow 7-14 business days. You may also download your forms to [employer] to have your doctor print off.

## 2019-01-29 NOTE — PROGRESS NOTES
Consult REFERRED BY: 
Klarissa Garcia MD 
 
CHIEF COMPLAINT: Back pain left leg pain Subjective:  
 
Renetta Brdashaw is a 79 y.o. right-handed  female seen as a new patient to me, request of Dr. Mady Lopez for evaluation of new problem progressive leg pain after she had a lumbar laminectomy in June 2018 for an arachnoid cyst and degenerative spinal stenosis at the L5-L4 level on the left side. 1 month after the surgery the patient did well but then started developing left leg pain and back pain that has persisted, despite Neurontin and Lyrica which she could not tolerate, and even Cymbalta did not seem to help that much and she had persistent pain. She tried an epidural steroid but had a bad reaction to that too. She was sent to us for further evaluation. She does not want many medications, and is already tried and is using a Lidoderm cream for her back which helps some. He does not really have any weakness in the leg it appears, just a lot of pain and some numbness and tingling in her feet on the left side. Her bowel and bladder function and right leg all worked normal.  He has no symptoms in the arm. She has not had any unusual rashes, fever, trauma, or other causes for her symptoms. We offered her pain management but she does not want to do that either. We offered her an EMG but she does not want to do that either. We tried to explain that might help in evaluating her symptoms and deciding on further need of therapeutic options, she wants no part of surgery or interventional techniques. Past Medical History:  
Diagnosis Date  Arthritis  Chronic pain   
 back; continues even after recent back procedure, pt has appt [facility] Dr. Tello Humphries (Neuro on 1/29/19, 1st appt)  Eczema   
 beneath both breasts  GERD (gastroesophageal reflux disease)  Hypertension  Nausea & vomiting  Nicotine vapor product user   
 currently uses electronic cigarettes  Psychiatric disorder anxiety  Unspecified adverse effect of anesthesia   
 hard to wake up Past Surgical History:  
Procedure Laterality Date  HX CATARACT REMOVAL    
 bilateral  
 HX CHOLECYSTECTOMY  10/03/2012 [address]  
 c section  HX GYN    
 hysterectomy/one ovary removed  HX ORTHOPAEDIC  2018  
 synovial cyst lumber spine  HX OTHER SURGICAL    
 colonoscopy  HX SALPINGO-OOPHORECTOMY Family History Problem Relation Age of Onset  Cancer Mother 68  
     lung  Heart Disease Father  Cancer Father 64  
     esophageal  
 Coronary Artery Disease Father  Heart Surgery Father  Arrhythmia Sister  Cancer Brother 64  
     liver Social History Tobacco Use  Smoking status: Former Smoker Packs/day: 1.00 Years: 30.00 Pack years: 30.00 Last attempt to quit: 2013 Years since quittin.8  Smokeless tobacco: Current User  Tobacco comment: electronic cigarettes about 10 times day Substance Use Topics  Alcohol use: No  
   
 
Current Outpatient Medications:  
  ferrous fumarate/vit Bcomp,C (SUPER B COMPLEX PO), Take  by mouth., Disp: , Rfl:  
  acetaminophen (TYLENOL) 325 mg tablet, Take  by mouth every four (4) hours as needed for Pain., Disp: , Rfl:  
  ibuprofen (ADVIL) 200 mg tablet, Take  by mouth., Disp: , Rfl:  
  lamoTRIgine (LAMICTAL) 25 mg tablet, Take 1 Tab by mouth two (2) times a day., Disp: 100 Tab, Rfl: 11 
  docosahexanoic acid/epa (FISH OIL PO), Take 1,200 mg by mouth daily. , Disp: , Rfl:  
  zolpidem (AMBIEN) 5 mg tablet, Take 5 mg by mouth nightly., Disp: , Rfl:  
  senna-docusate (SENNA PLUS) 8.6-50 mg per tablet, Take 1 Tab by mouth two (2) times a day., Disp: 60 Tab, Rfl: 0 
  cholecalciferol, vitamin D3, (VITAMIN D3) 2,000 unit tab, Take 1 Tab by mouth daily. , Disp: , Rfl:  
  lisinopril-hydrochlorothiazide (PRINZIDE, ZESTORETIC) 20-12.5 mg per tablet, Take 1 Tab by mouth every morning., Disp: , Rfl:  
 
 
 
Allergies Allergen Reactions  Codeine Itching  Erythromycin Nausea Only MRI Results (most recent): 
Results from Hospital Encounter encounter on 10/24/18 MRI LUMB SPINE W WO CONT Narrative EXAM:  MRI LUMB SPINE W WO CONT History: Lumbar radiculopathy INDICATION:  Lumbar radiculopathy. COMPARISON: 2/9/2018 TECHNIQUE: MR imaging of the lumbar spine was performed using the following 
sequences: sagittal T1, T2, STIR;  axial T1, T2 prior to and following contrast 
administration. CONTRAST: 15 mL of Dotarem. FINDINGS: 
 
Interval postprocedural changes at L5-S1. There is normal alignment of the 
lumbar spine. Vertebral body heights are maintained. Marrow signal is normal. 
 
The conus medullaris terminates at L1-L2. Minimal disc desiccation in the lower 
thoracic spine. Abdominal aorta normal in caliber. Proximal common iliac vessels 
normal in caliber. . Signal and caliber of the distal spinal cord are within 
normal limits. T2 hyperdense focus in the left kidney is most likely a simple 
cyst. No sacral fracture. Enhancing granulation tissue L1-L2:  No herniation or 
stenosis. L2-L3:  Disc bulge. Canal is patent. Foramina are patent. L3-L4: Mild facet arthropathy. Mild dumbbell-shaped is protrusion. There is mild 
canal stenosis. Mild right and mild to moderate left foraminal stenosis. Betha Lute L4-L5:  Facet arthropathy is mild-to-moderate. Mild left foraminal protrusion. Canal is patent. Effacement of nerve roots extending to the left L5-S1 foramen. Mild left foraminal stenosis. L5-S1:  Facet arthropathy and hypertrophy is slightly greater on the left. Fluid 
in the facet joints bilaterally. Enhancing granulation tissue at the left 
lateral recess hemilaminotomy site without recurrent disc protrusion. Hemilaminotomy. Canal is patent. Minimal bilateral foraminal stenosis.    
 Impression IMPRESSION: 
 Multilevel disc and facet degenerative change. Resolved canal and severe left lateral recess stenosis at L5-S1. Hemilaminotomy 
on the left at L5-S1 with no unusual postprocedural findings. Enhancing 
granulation tissue at the left lateral recess L5-S1. No recurrent disc 
protrusion. Mild bilateral foraminal stenoses at L5-S1. Minimal progression of degenerative change elsewhere. There is mild right and mild to moderate left foraminal stenosis L3-L4 which is 
slightly progressed compared to the prior study. Results from Hospital Encounter encounter on 10/24/18 MRI LUMB SPINE W WO CONT Narrative EXAM:  MRI LUMB SPINE W WO CONT History: Lumbar radiculopathy INDICATION:  Lumbar radiculopathy. COMPARISON: 2/9/2018 TECHNIQUE: MR imaging of the lumbar spine was performed using the following 
sequences: sagittal T1, T2, STIR;  axial T1, T2 prior to and following contrast 
administration. CONTRAST: 15 mL of Dotarem. FINDINGS: 
 
Interval postprocedural changes at L5-S1. There is normal alignment of the 
lumbar spine. Vertebral body heights are maintained. Marrow signal is normal. 
 
The conus medullaris terminates at L1-L2. Minimal disc desiccation in the lower 
thoracic spine. Abdominal aorta normal in caliber. Proximal common iliac vessels 
normal in caliber. . Signal and caliber of the distal spinal cord are within 
normal limits. T2 hyperdense focus in the left kidney is most likely a simple 
cyst. No sacral fracture. Enhancing granulation tissue L1-L2:  No herniation or 
stenosis. L2-L3:  Disc bulge. Canal is patent. Foramina are patent. L3-L4: Mild facet arthropathy. Mild dumbbell-shaped is protrusion. There is mild 
canal stenosis. Mild right and mild to moderate left foraminal stenosis. Michaelyn Daily L4-L5:  Facet arthropathy is mild-to-moderate. Mild left foraminal protrusion. Canal is patent. Effacement of nerve roots extending to the left L5-S1 foramen. Mild left foraminal stenosis. L5-S1:  Facet arthropathy and hypertrophy is slightly greater on the left. Fluid 
in the facet joints bilaterally. Enhancing granulation tissue at the left 
lateral recess hemilaminotomy site without recurrent disc protrusion. Hemilaminotomy. Canal is patent. Minimal bilateral foraminal stenosis. Impression IMPRESSION: 
Multilevel disc and facet degenerative change. Resolved canal and severe left lateral recess stenosis at L5-S1. Hemilaminotomy 
on the left at L5-S1 with no unusual postprocedural findings. Enhancing 
granulation tissue at the left lateral recess L5-S1. No recurrent disc 
protrusion. Mild bilateral foraminal stenoses at L5-S1. Minimal progression of degenerative change elsewhere. There is mild right and mild to moderate left foraminal stenosis L3-L4 which is 
slightly progressed compared to the prior study. Review of Systems: A comprehensive review of systems was negative except for: Constitutional: positive for fatigue and malaise Musculoskeletal: positive for myalgias, arthralgias, stiff joints and back pain Neurological: positive for paresthesia and Back pain Behvioral/Psych: positive for anxiety and depression Vitals:  
 01/29/19 1354 BP: 162/82 Pulse: 79 Resp: 26 SpO2: 98% Weight: 146 lb (66.2 kg) Height: 5' 1\" (1.549 m) Objective: I 
 
 
NEUROLOGICAL EXAM: 
 
Appearance: The patient is well developed, well nourished, provides a coherent history and is in no acute distress. Mental Status: Oriented to time, place and person, and the president, cognitive function is normal and speech is fluent and no aphasia or dysarthria. Mood and affect appropriate, but a little anxious and depressed. Cranial Nerves:   Intact visual fields. Fundi are benign, disc are flat, no lesions seen on funduscopy. RIA, EOM's full, no nystagmus, no ptosis. Facial sensation is normal. Corneal reflexes are not tested.  Facial movement is symmetric. Hearing is normal bilaterally. Palate is midline with normal sternocleidomastoid and trapezius muscles are normal. Tongue is midline. Neck without meningismus or bruits Temporal arteries are not tender or enlarged TMJ areas are not tender on palpation Motor:  5/5 strength in upper and lower proximal and distal muscles, but patient's evertors on the left seem a little weak, but she also gave way because of pain. . Normal bulk and tone. No fasciculations. Straight leg raising test is negative in sitting position bilaterally Rapid alternating movement is symmetric and intact bilaterally Reflexes:   Deep tendon reflexes 2+/4 and symmetrical, except for decreased left ankle jerk No babinski or clonus present Sensory:   Normal to touch, pinprick and vibration and temperature. DSS is intact Gait:  Normal gait for patient's age, but she does walk with a slight limp on the left leg. Eura Diamond Tremor:   No tremor noted. Cerebellar:  No abnormal cerebellar signs present on Romberg and tandem testing and finger-nose-finger exam.  
Neurovascular:  Normal heart sounds and regular rhythm, peripheral pulses decreased, and no carotid bruits. Assessment: ICD-10-CM ICD-9-CM 1. Lumbar back pain with radiculopathy affecting left lower extremity M54.16 724.4 lamoTRIgine (LAMICTAL) 25 mg tablet 2. History of lumbar laminectomy Z98.890 V45.89 lamoTRIgine (LAMICTAL) 25 mg tablet 3. Left leg pain M79.605 729.5 lamoTRIgine (LAMICTAL) 25 mg tablet Active Problems: * No active hospital problems. * 
 
 
Plan:  
 
Patient with postlaminectomy syndrome, and I reviewed her MRI scan done October 2018 personally on the PACS system and it does show fairly severe arachnoiditis and fibrosis on the left side of her spinal canal and entrapping her left L5 root and probably even some of her S1 root.  
Advised the patient that epidural steroid seems to be the best treatment for this, but she does not want to try that again. We offered her referral to pain management, offered her an EMG but she refuses both of these. We will try her on Lamictal 25 mg twice a day just to see if another anticonvulsant can help with her neuropathic pain. She is to continue an exercise program, and I encouraged her to say that gentle stretching range of motion exercises oftentimes helps this condition, he recommended sugar chi or yoga of the stretching type in addition. I told her we did not have any medication for the arachnoiditis, and normally as best lives alone and not reoperate because that usually just accelerated the problem. We will see her back in 3-6 months time or earlier as need be. She will call if she changes her mind about any other test. 
 
Signed By: Britney Nicholas MD   
 January 29, 2019 CC: Penelope Iverson MD 
FAX: [phone] This note will not be viewable in [address]

## 2019-01-29 NOTE — LETTER
1/29/2019 5:02 PM 
 
Patient:  Clint Lu YOB: 1950 Date of Visit: 1/29/2019 Dear No Recipients: Thank you for referring Ms. Cathie Toribio to me for evaluation/treatment. Below are the relevant portions of my assessment and plan of care. Consult REFERRED BY: 
Rachel Kim MD 
 
CHIEF COMPLAINT: Back pain left leg pain Subjective:  
 
Clint Lu is a 79 y.o. right-handed  female seen as a new patient to me, request of Dr. Enid Noguera for evaluation of new problem progressive leg pain after she had a lumbar laminectomy in June 2018 for an arachnoid cyst and degenerative spinal stenosis at the L5-L4 level on the left side. 1 month after the surgery the patient did well but then started developing left leg pain and back pain that has persisted, despite Neurontin and Lyrica which she could not tolerate, and even Cymbalta did not seem to help that much and she had persistent pain. She tried an epidural steroid but had a bad reaction to that too. She was sent to us for further evaluation. She does not want many medications, and is already tried and is using a Lidoderm cream for her back which helps some. He does not really have any weakness in the leg it appears, just a lot of pain and some numbness and tingling in her feet on the left side. Her bowel and bladder function and right leg all worked normal.  He has no symptoms in the arm. She has not had any unusual rashes, fever, trauma, or other causes for her symptoms. We offered her pain management but she does not want to do that either. We offered her an EMG but she does not want to do that either. We tried to explain that might help in evaluating her symptoms and deciding on further need of therapeutic options, she wants no part of surgery or interventional techniques. Past Medical History:  
Diagnosis Date  Arthritis  Chronic pain back; continues even after recent back procedure, pt has appt marianna Albrecht (Neuro on 19, 1st appt)  Eczema   
 beneath both breasts  GERD (gastroesophageal reflux disease)  Hypertension  Nausea & vomiting  Nicotine vapor product user   
 currently uses electronic cigarettes  Psychiatric disorder   
 anxiety  Unspecified adverse effect of anesthesia   
 hard to wake up Past Surgical History:  
Procedure Laterality Date  HX CATARACT REMOVAL    
 bilateral  
 HX CHOLECYSTECTOMY  10/03/2012 [address]  
 c section  HX GYN    
 hysterectomy/one ovary removed  HX ORTHOPAEDIC  2018  
 synovial cyst lumber spine  HX OTHER SURGICAL    
 colonoscopy  HX SALPINGO-OOPHORECTOMY Family History Problem Relation Age of Onset  Cancer Mother 68  
     lung  Heart Disease Father  Cancer Father 64  
     esophageal  
 Coronary Artery Disease Father  Heart Surgery Father  Arrhythmia Sister  Cancer Brother 64  
     liver Social History Tobacco Use  Smoking status: Former Smoker Packs/day: 1.00 Years: 30.00 Pack years: 30.00 Last attempt to quit: 2013 Years since quittin.8  Smokeless tobacco: Current User  Tobacco comment: electronic cigarettes about 10 times day Substance Use Topics  Alcohol use: No  
   
 
Current Outpatient Medications:  
  ferrous fumarate/vit Bcomp,C (SUPER B COMPLEX PO), Take  by mouth., Disp: , Rfl:  
  acetaminophen (TYLENOL) 325 mg tablet, Take  by mouth every four (4) hours as needed for Pain., Disp: , Rfl:  
  ibuprofen (ADVIL) 200 mg tablet, Take  by mouth., Disp: , Rfl:  
  lamoTRIgine (LAMICTAL) 25 mg tablet, Take 1 Tab by mouth two (2) times a day., Disp: 100 Tab, Rfl: 11 
  docosahexanoic acid/epa (FISH OIL PO), Take 1,200 mg by mouth daily. , Disp: , Rfl:  
  zolpidem (AMBIEN) 5 mg tablet, Take 5 mg by mouth nightly., Disp: , Rfl:  
   senna-docusate (SENNA PLUS) 8.6-50 mg per tablet, Take 1 Tab by mouth two (2) times a day., Disp: 60 Tab, Rfl: 0 
  cholecalciferol, vitamin D3, (VITAMIN D3) 2,000 unit tab, Take 1 Tab by mouth daily. , Disp: , Rfl:  
  lisinopril-hydrochlorothiazide (PRINZIDE, ZESTORETIC) 20-12.5 mg per tablet, Take 1 Tab by mouth every morning., Disp: , Rfl:  
 
 
 
Allergies Allergen Reactions  Codeine Itching  Erythromycin Nausea Only MRI Results (most recent): 
Results from Hospital Encounter encounter on 10/24/18 MRI LUMB SPINE W WO CONT Narrative EXAM:  MRI LUMB SPINE W WO CONT History: Lumbar radiculopathy INDICATION:  Lumbar radiculopathy. COMPARISON: 2/9/2018 TECHNIQUE: MR imaging of the lumbar spine was performed using the following 
sequences: sagittal T1, T2, STIR;  axial T1, T2 prior to and following contrast 
administration. CONTRAST: 15 mL of Dotarem. FINDINGS: 
 
Interval postprocedural changes at L5-S1. There is normal alignment of the 
lumbar spine. Vertebral body heights are maintained. Marrow signal is normal. 
 
The conus medullaris terminates at L1-L2. Minimal disc desiccation in the lower 
thoracic spine. Abdominal aorta normal in caliber. Proximal common iliac vessels 
normal in caliber. . Signal and caliber of the distal spinal cord are within 
normal limits. T2 hyperdense focus in the left kidney is most likely a simple 
cyst. No sacral fracture. Enhancing granulation tissue L1-L2:  No herniation or 
stenosis. L2-L3:  Disc bulge. Canal is patent. Foramina are patent. L3-L4: Mild facet arthropathy. Mild dumbbell-shaped is protrusion. There is mild 
canal stenosis. Mild right and mild to moderate left foraminal stenosis. Larraine Piles L4-L5:  Facet arthropathy is mild-to-moderate. Mild left foraminal protrusion. Canal is patent. Effacement of nerve roots extending to the left L5-S1 foramen. Mild left foraminal stenosis. L5-S1:  Facet arthropathy and hypertrophy is slightly greater on the left. Fluid 
in the facet joints bilaterally. Enhancing granulation tissue at the left 
lateral recess hemilaminotomy site without recurrent disc protrusion. Hemilaminotomy. Canal is patent. Minimal bilateral foraminal stenosis. Impression IMPRESSION: 
Multilevel disc and facet degenerative change. Resolved canal and severe left lateral recess stenosis at L5-S1. Hemilaminotomy 
on the left at L5-S1 with no unusual postprocedural findings. Enhancing 
granulation tissue at the left lateral recess L5-S1. No recurrent disc 
protrusion. Mild bilateral foraminal stenoses at L5-S1. Minimal progression of degenerative change elsewhere. There is mild right and mild to moderate left foraminal stenosis L3-L4 which is 
slightly progressed compared to the prior study. Results from Hospital Encounter encounter on 10/24/18 MRI LUMB SPINE W WO CONT Narrative EXAM:  MRI LUMB SPINE W WO CONT History: Lumbar radiculopathy INDICATION:  Lumbar radiculopathy. COMPARISON: 2/9/2018 TECHNIQUE: MR imaging of the lumbar spine was performed using the following 
sequences: sagittal T1, T2, STIR;  axial T1, T2 prior to and following contrast 
administration. CONTRAST: 15 mL of Dotarem. FINDINGS: 
 
Interval postprocedural changes at L5-S1. There is normal alignment of the 
lumbar spine. Vertebral body heights are maintained. Marrow signal is normal. 
 
The conus medullaris terminates at L1-L2. Minimal disc desiccation in the lower 
thoracic spine. Abdominal aorta normal in caliber. Proximal common iliac vessels 
normal in caliber. . Signal and caliber of the distal spinal cord are within 
normal limits. T2 hyperdense focus in the left kidney is most likely a simple 
cyst. No sacral fracture. Enhancing granulation tissue L1-L2:  No herniation or 
stenosis. L2-L3:  Disc bulge. Canal is patent. Foramina are patent. L3-L4: Mild facet arthropathy. Mild dumbbell-shaped is protrusion. There is mild 
canal stenosis. Mild right and mild to moderate left foraminal stenosis. Aditi Devon L4-L5:  Facet arthropathy is mild-to-moderate. Mild left foraminal protrusion. Canal is patent. Effacement of nerve roots extending to the left L5-S1 foramen. Mild left foraminal stenosis. L5-S1:  Facet arthropathy and hypertrophy is slightly greater on the left. Fluid 
in the facet joints bilaterally. Enhancing granulation tissue at the left 
lateral recess hemilaminotomy site without recurrent disc protrusion. Hemilaminotomy. Canal is patent. Minimal bilateral foraminal stenosis. Impression IMPRESSION: 
Multilevel disc and facet degenerative change. Resolved canal and severe left lateral recess stenosis at L5-S1. Hemilaminotomy 
on the left at L5-S1 with no unusual postprocedural findings. Enhancing 
granulation tissue at the left lateral recess L5-S1. No recurrent disc 
protrusion. Mild bilateral foraminal stenoses at L5-S1. Minimal progression of degenerative change elsewhere. There is mild right and mild to moderate left foraminal stenosis L3-L4 which is 
slightly progressed compared to the prior study. Review of Systems: A comprehensive review of systems was negative except for: Constitutional: positive for fatigue and malaise Musculoskeletal: positive for myalgias, arthralgias, stiff joints and back pain Neurological: positive for paresthesia and Back pain Behvioral/Psych: positive for anxiety and depression Vitals:  
 01/29/19 1354 BP: 162/82 Pulse: 79 Resp: 26 SpO2: 98% Weight: 146 lb (66.2 kg) Height: 5' 1\" (1.549 m) Objective: I 
 
 
NEUROLOGICAL EXAM: 
 
Appearance: The patient is well developed, well nourished, provides a coherent history and is in no acute distress.   
Mental Status: Oriented to time, place and person, and the president, cognitive function is normal and speech is fluent and no aphasia or dysarthria. Mood and affect appropriate, but a little anxious and depressed. Cranial Nerves:   Intact visual fields. Fundi are benign, disc are flat, no lesions seen on funduscopy. RIA, EOM's full, no nystagmus, no ptosis. Facial sensation is normal. Corneal reflexes are not tested. Facial movement is symmetric. Hearing is normal bilaterally. Palate is midline with normal sternocleidomastoid and trapezius muscles are normal. Tongue is midline. Neck without meningismus or bruits Temporal arteries are not tender or enlarged TMJ areas are not tender on palpation Motor:  5/5 strength in upper and lower proximal and distal muscles, but patient's evertors on the left seem a little weak, but she also gave way because of pain. . Normal bulk and tone. No fasciculations. Straight leg raising test is negative in sitting position bilaterally Rapid alternating movement is symmetric and intact bilaterally Reflexes:   Deep tendon reflexes 2+/4 and symmetrical, except for decreased left ankle jerk No babinski or clonus present Sensory:   Normal to touch, pinprick and vibration and temperature. DSS is intact Gait:  Normal gait for patient's age, but she does walk with a slight limp on the left leg. Maria C [place] Tremor:   No tremor noted. Cerebellar:  No abnormal cerebellar signs present on Romberg and tandem testing and finger-nose-finger exam.  
Neurovascular:  Normal heart sounds and regular rhythm, peripheral pulses decreased, and no carotid bruits. Assessment: ICD-10-CM ICD-9-CM 1. Lumbar back pain with radiculopathy affecting left lower extremity M54.16 724.4 lamoTRIgine (LAMICTAL) 25 mg tablet 2. History of lumbar laminectomy Z98.890 V45.89 lamoTRIgine (LAMICTAL) 25 mg tablet 3. Left leg pain M79.605 729.5 lamoTRIgine (LAMICTAL) 25 mg tablet Active Problems: * No active hospital problems. * 
 
 
Plan: Patient with postlaminectomy syndrome, and I reviewed her MRI scan done October 2018 personally on the PACS system and it does show fairly severe arachnoiditis and fibrosis on the left side of her spinal canal and entrapping her left L5 root and probably even some of her S1 root. Advised the patient that epidural steroid seems to be the best treatment for this, but she does not want to try that again. We offered her referral to pain management, offered her an EMG but she refuses both of these. We will try her on Lamictal 25 mg twice a day just to see if another anticonvulsant can help with her neuropathic pain. She is to continue an exercise program, and I encouraged her to say that gentle stretching range of motion exercises oftentimes helps this condition, he recommended sugar chi or yoga of the stretching type in addition. I told her we did not have any medication for the arachnoiditis, and normally as best lives alone and not reoperate because that usually just accelerated the problem. We will see her back in 3-6 months time or earlier as need be. She will call if she changes her mind about any other test. 
 
Signed By: Harris Le MD   
 January 29, 2019 CC: Philipp Castillo MD 
FAX: [phone] This note will not be viewable in [address] If you have questions, please do not hesitate to call me. I look forward to following Ms. Renee along with you. Sincerely, Harris Le MD

## 2019-01-30 ENCOUNTER — HOSPITAL ENCOUNTER (OUTPATIENT)
Age: 69
Setting detail: OUTPATIENT SURGERY
Discharge: HOME | End: 2019-01-30
Attending: COLON & RECTAL SURGERY | Admitting: COLON & RECTAL SURGERY
Payer: MEDICARE

## 2019-01-30 ENCOUNTER — ANESTHESIA (OUTPATIENT)
Dept: SURGERY | Age: 69
End: 2019-01-30
Payer: MEDICARE

## 2019-01-30 VITALS
SYSTOLIC BLOOD PRESSURE: 115 MMHG | BODY MASS INDEX: 27.02 KG/M2 | HEIGHT: 61 IN | HEART RATE: 59 BPM | OXYGEN SATURATION: 97 % | DIASTOLIC BLOOD PRESSURE: 56 MMHG | RESPIRATION RATE: 12 BRPM | TEMPERATURE: 98.2 F | WEIGHT: 143.13 LBS

## 2019-01-30 PROBLEM — Z12.11 ENCOUNTER FOR COLONOSCOPY DUE TO HISTORY OF ADENOMATOUS COLONIC POLYPS: Status: ACTIVE | Noted: 2019-01-30

## 2019-01-30 PROBLEM — Z86.010 ENCOUNTER FOR COLONOSCOPY DUE TO HISTORY OF ADENOMATOUS COLONIC POLYPS: Status: ACTIVE | Noted: 2019-01-30

## 2019-01-30 PROCEDURE — 76210000050 HC AMBSU PH II REC 0.5 TO 1 HR: Performed by: COLON & RECTAL SURGERY

## 2019-01-30 PROCEDURE — 88305 TISSUE EXAM BY PATHOLOGIST: CPT

## 2019-01-30 PROCEDURE — 77030013992 HC SNR POLYP ENDOSC BSC -B: Performed by: COLON & RECTAL SURGERY

## 2019-01-30 PROCEDURE — 77030020255 HC SOL INJ LR 1000ML BG: Performed by: COLON & RECTAL SURGERY

## 2019-01-30 PROCEDURE — 77030009426 HC FCPS BIOP ENDOSC BSC -B: Performed by: COLON & RECTAL SURGERY

## 2019-01-30 PROCEDURE — 77030021352 HC CBL LD SYS DISP COVD -B: Performed by: COLON & RECTAL SURGERY

## 2019-01-30 PROCEDURE — 74011250636 HC RX REV CODE- 250/636

## 2019-01-30 PROCEDURE — 76060000061 HC AMB SURG ANES 0.5 TO 1 HR: Performed by: COLON & RECTAL SURGERY

## 2019-01-30 PROCEDURE — 76030000000 HC AMB SURG OR TIME 0.5 TO 1: Performed by: COLON & RECTAL SURGERY

## 2019-01-30 RX ORDER — LIDOCAINE HYDROCHLORIDE 20 MG/ML
INJECTION, SOLUTION EPIDURAL; INFILTRATION; INTRACAUDAL; PERINEURAL AS NEEDED
Status: DISCONTINUED | OUTPATIENT
Start: 2019-01-30 | End: 2019-01-30 | Stop reason: HOSPADM

## 2019-01-30 RX ORDER — ONDANSETRON 2 MG/ML
4 INJECTION INTRAMUSCULAR; INTRAVENOUS AS NEEDED
Status: DISCONTINUED | OUTPATIENT
Start: 2019-01-30 | End: 2019-01-30 | Stop reason: HOSPADM

## 2019-01-30 RX ORDER — FENTANYL CITRATE 50 UG/ML
25 INJECTION, SOLUTION INTRAMUSCULAR; INTRAVENOUS
Status: DISCONTINUED | OUTPATIENT
Start: 2019-01-30 | End: 2019-01-30 | Stop reason: HOSPADM

## 2019-01-30 RX ORDER — PROPOFOL 10 MG/ML
INJECTION, EMULSION INTRAVENOUS AS NEEDED
Status: DISCONTINUED | OUTPATIENT
Start: 2019-01-30 | End: 2019-01-30 | Stop reason: HOSPADM

## 2019-01-30 RX ORDER — MIDAZOLAM HYDROCHLORIDE 1 MG/ML
0.5 INJECTION, SOLUTION INTRAMUSCULAR; INTRAVENOUS
Status: DISCONTINUED | OUTPATIENT
Start: 2019-01-30 | End: 2019-01-30 | Stop reason: HOSPADM

## 2019-01-30 RX ORDER — FENTANYL CITRATE 50 UG/ML
50 INJECTION, SOLUTION INTRAMUSCULAR; INTRAVENOUS AS NEEDED
Status: DISCONTINUED | OUTPATIENT
Start: 2019-01-30 | End: 2019-01-30 | Stop reason: HOSPADM

## 2019-01-30 RX ORDER — DIPHENHYDRAMINE HYDROCHLORIDE 50 MG/ML
12.5 INJECTION, SOLUTION INTRAMUSCULAR; INTRAVENOUS AS NEEDED
Status: DISCONTINUED | OUTPATIENT
Start: 2019-01-30 | End: 2019-01-30 | Stop reason: HOSPADM

## 2019-01-30 RX ORDER — PHENYLEPHRINE HCL IN 0.9% NACL 0.4MG/10ML
SYRINGE (ML) INTRAVENOUS AS NEEDED
Status: DISCONTINUED | OUTPATIENT
Start: 2019-01-30 | End: 2019-01-30 | Stop reason: HOSPADM

## 2019-01-30 RX ORDER — SODIUM CHLORIDE, SODIUM LACTATE, POTASSIUM CHLORIDE, CALCIUM CHLORIDE 600; 310; 30; 20 MG/100ML; MG/100ML; MG/100ML; MG/100ML
INJECTION, SOLUTION INTRAVENOUS
Status: DISCONTINUED | OUTPATIENT
Start: 2019-01-30 | End: 2019-01-30 | Stop reason: HOSPADM

## 2019-01-30 RX ORDER — MORPHINE SULFATE 10 MG/ML
2 INJECTION, SOLUTION INTRAMUSCULAR; INTRAVENOUS
Status: DISCONTINUED | OUTPATIENT
Start: 2019-01-30 | End: 2019-01-30 | Stop reason: HOSPADM

## 2019-01-30 RX ORDER — SODIUM CHLORIDE 0.9 % (FLUSH) 0.9 %
5-40 SYRINGE (ML) INJECTION AS NEEDED
Status: DISCONTINUED | OUTPATIENT
Start: 2019-01-30 | End: 2019-01-30 | Stop reason: HOSPADM

## 2019-01-30 RX ORDER — HYDROMORPHONE HYDROCHLORIDE 1 MG/ML
.2-.5 INJECTION, SOLUTION INTRAMUSCULAR; INTRAVENOUS; SUBCUTANEOUS
Status: DISCONTINUED | OUTPATIENT
Start: 2019-01-30 | End: 2019-01-30 | Stop reason: HOSPADM

## 2019-01-30 RX ORDER — SODIUM CHLORIDE 0.9 % (FLUSH) 0.9 %
5-40 SYRINGE (ML) INJECTION EVERY 8 HOURS
Status: DISCONTINUED | OUTPATIENT
Start: 2019-01-30 | End: 2019-01-30 | Stop reason: HOSPADM

## 2019-01-30 RX ORDER — LIDOCAINE HYDROCHLORIDE 10 MG/ML
0.1 INJECTION, SOLUTION EPIDURAL; INFILTRATION; INTRACAUDAL; PERINEURAL AS NEEDED
Status: DISCONTINUED | OUTPATIENT
Start: 2019-01-30 | End: 2019-01-30 | Stop reason: HOSPADM

## 2019-01-30 RX ADMIN — PROPOFOL 20 MG: 10 INJECTION, EMULSION INTRAVENOUS at 11:23

## 2019-01-30 RX ADMIN — Medication 40 MCG: at 11:25

## 2019-01-30 RX ADMIN — PROPOFOL 50 MG: 10 INJECTION, EMULSION INTRAVENOUS at 11:08

## 2019-01-30 RX ADMIN — Medication 80 MCG: at 11:21

## 2019-01-30 RX ADMIN — PROPOFOL 50 MG: 10 INJECTION, EMULSION INTRAVENOUS at 11:11

## 2019-01-30 RX ADMIN — LIDOCAINE HYDROCHLORIDE 40 MG: 20 INJECTION, SOLUTION EPIDURAL; INFILTRATION; INTRACAUDAL; PERINEURAL at 11:08

## 2019-01-30 RX ADMIN — SODIUM CHLORIDE, SODIUM LACTATE, POTASSIUM CHLORIDE, CALCIUM CHLORIDE: 600; 310; 30; 20 INJECTION, SOLUTION INTRAVENOUS at 10:52

## 2019-01-30 RX ADMIN — PROPOFOL 50 MG: 10 INJECTION, EMULSION INTRAVENOUS at 11:15

## 2019-01-30 RX ADMIN — PROPOFOL 50 MG: 10 INJECTION, EMULSION INTRAVENOUS at 11:18

## 2019-01-30 NOTE — BRIEF OP NOTE
BRIEF OPERATIVE NOTE Date of Procedure: 1/30/2019 Preoperative Diagnosis: HISTORY OF POLYPS Postoperative Diagnosis: Sigmoid Diverticulosis, Recto-Sigmoid Polyp Procedure(s): 
COLONOSCOPY 
COLONOSCOPY WITH POLYPECTOMY Surgeon(s) and Role: Kate Barton MD - Primary Surgical Assistant: None Surgical Staff: 
Circ-1: Bethany Brar RN 
Circ-2: Rory Landau, RN Scrub Tech-1: Alvarado Ambrizon Event Time In Time Out Incision Start [phone] Incision Close 1128 Anesthesia: MAC Estimated Blood Loss: none Specimens:  
ID Type Source Tests Collected by Time Destination 1 : Recto-Sigmoid Colon Polyp Preservative Sigmoid  Kate Barton MD 1/30/2019 1124 Pathology Findings: sigmoid diverticuli;  5 to 6 mm polyp at rectosigmoid, snared ; Otherwise, normal exam 
Complications: None Implants: * No implants in log *

## 2019-01-30 NOTE — PERIOP NOTES
Assumed care as lunch relief. D/C instructions revieweed with pt/spouse. 1202 Sitting on side of bed to dress. Nurse at bedside [phone] Discharged to home via/wc,accompanied to car per RN. Skin warm and dry, awake and alert. Respirations even, unlabored. Pt and family members questions and concerns addressed prior to discharge. All belongings with pt.

## 2019-01-30 NOTE — PERIOP NOTES
Permission received to review discharge instructions and discuss private health information with  Cornel Pierson.  Cornel Pierson at bedside, updated on pt's status. VSS. Pt tolerating liquids. Pt passing gas w/o difficulty.

## 2019-01-30 NOTE — OP NOTES
[facility]  OPERATIVE REPORT    Name:Anayeli LANIER  MR#: [id number]  : 1950  ACCOUNT #: [de-identified]   DATE OF SERVICE: 2019    PREOPERATIVE DIAGNOSIS:  History of colonic polyps. POSTOPERATIVE DIAGNOSIS:  History of colonic polyps with 5-6 mm polyp at the rectosigmoid junction, removed with a snare polypectomy, and mild to moderate sigmoid diverticulosis. PROCEDURE PERFORMED:  Total colonoscopy to cecum with snare polypectomy. SURGEON:  Patel Kessler MD    ASSISTANT:  None. ANESTHESIA:  IV sedation with propofol per Anesthesia. ESTIMATED BLOOD LOSS:  None. SPECIMENS:  Sigmoid polyp. COMPLICATIONS:  None. IMPLANTS:  None. INDICATIONS:  The patient is a very nice 30-year-old white female who is in today for a 5-year interval screening colonoscopy. She has a prior history of polyps. She is aware of the risks including bleeding, perforation and the risk of missing small polyps and she is agreeable to proceed. PROCEDURE IN DETAIL:  After uneventful induction of IV sedation, the patient was placed in the left lateral side. The pediatric 180 stiffening scope passed through the colon to the cecal cap without difficulty. Position was confirmed with light reflex and local anatomic landmarks. Ileocecal valve was normal and was photographed simply to document location and anatomy. Prep was excellent. Scope was slowly and carefully pulled back. The ascending and transverse colon were completely normal.  Descending colon was normal.  The sigmoid had moderate diverticulosis. At the rectosigmoid junction, there was a single 6 mm polyp, which was snared and retrieved. The scope was retracted back down to the rectum, which was normal.  Anal outlet was unremarkable. Air was aspirated. The scope was removed and the exam terminated. The patient tolerated the exam well, remained stable and left with a benign abdomen.   She to undergo followup exam in 5 years given her past history of polyps.       MD SOHAM Suárez / Mariana Espitia  D: 01/30/2019 11:47     T: 01/30/2019 12:34  JOB #: [id number]  CC: Scott Quinn MD  CC: Nani Becker MD

## 2019-01-30 NOTE — PERIOP NOTES
Maddie Paws applied at this time and set to appropriate temperature. Patient given remote and instructed on use. Will continue to monitor.

## 2019-01-30 NOTE — PERIOP NOTES
Permission received to review discharge instructions and discuss private health information with  Adebayo Nelson

## 2019-01-30 NOTE — DISCHARGE INSTRUCTIONS
Zully Tracy  [id number]  1950    COLON DISCHARGE INSTRUCTIONS  Discomfort:  Redness at IV site- apply warm compress to area; if redness or soreness persist- contact your physician  There may be a slight amount of blood passed from the rectum  Gaseous discomfort- walking, belching will help relieve any discomfort  You may not operate a vehicle for 24 hours  You may not engage in an occupation involving machinery or appliances for rest of today  You may not drink alcoholic beverages for at least 24 hours  Avoid making any critical decisions for at least 24 hour  DIET:   Regular diet. - however -  remember your colon is empty and a heavy meal will produce gas. Avoid these foods:  vegetables, fried / greasy foods, carbonated drinks for today     ACTIVITY:  You may not  resume your normal daily activities it is recommended that you spend the remainder of the day resting -  avoid any strenuous activity. CALL M.D. ANY SIGN OF:   Increasing pain, nausea, vomiting  Abdominal distension (swelling)  New increased bleeding (oral or rectal)  Fever (chills)  Pain in chest area  Bloody discharge from nose or mouth  Shortness of breath    You may not  take any Advil, Aspirin, Ibuprofen, Motrin, Aleve, or Goodys for 10 days, ONLY  Tylenol as needed for pain. Post procedure diagnosis:  Sigmoid Diverticulosis, Recto-Sigmoid Polyp  Follow-up Instructions:   Call Dr. Sherri Arellano if any questions  Telephone #  [phone]  Additional instructions ;  followup c-scope in 5 years; Resume ibuprofen in 10 days.                   Zully Molina  [id number]  1950        DISCHARGE SUMMARY from Nurse    The following personal items collected during your admission are returned to you:   Dental Appliance: Dental Appliances: None  Vision: Visual Aid: Glasses(in pt belonging bag)  Hearing Aid:    Jewelry:    Clothing:    Other Valuables:    Valuables sent to safe:            DO NOT TAKE SLEEPING MEDICATIONS OR ANTIANXIETY MEDICATIONS WHILE TAKING NARCOTIC PAIN MEDICATIONS,  ESPECIALLY THE NIGHT OF ANESTHESIA. CPAP PATIENTS BE SURE TO WEAR MACHINE WHENEVER NAPPING OR SLEEPING. DISCHARGE SUMMARY from Nurse    The following personal items collected during your admission are returned to you:   Dental Appliance: Dental Appliances: None  Vision: Visual Aid: Glasses(in pt belonging bag)  Hearing Aid:    Jewelry:    Clothing:    Other Valuables:    Valuables sent to safe:        PATIENT INSTRUCTIONS:    After General Anesthesia or Intravenous Sedation, for 24 hours or while taking prescription Narcotics:        Someone should be with you for the next 24 hours. For your own safety, a responsible adult must drive you home. · Limit your activities  · Recommended activity: Rest today, up with assistance today. Do not climb stairs or shower unattended for the next 24 hours. · Please start with a soft bland diet and advance as tolerated (no nausea) to regular diet. · If you have a sore throat you should try the following: fluids, warm salt water gargles, or throat lozenges. If it does not improve after several days please follow up with your primary physician. · Do not drive and operate hazardous machinery  · Do not make important personal or business decisions  · Do  not drink alcoholic beverages  · If you have not urinated within 8 hours after discharge, please contact your surgeon on call. Report the following to your surgeon:  · Excessive pain, swelling, redness or odor of or around the surgical area  · Temperature over 100.5  · Nausea and vomiting lasting longer than 4 hours or if unable to take medications  · Any signs of decreased circulation or nerve impairment to extremity: change in color, persistent  numbness, tingling, coldness or increase pain      · You will receive a Post Operative Call from one of the Recovery Room Nurses on the day after your surgery to check on you.  It is very important for us to know how you are recovering after your surgery. If you have an issue or need to speak with someone, please call your surgeon, do not wait for the post operative call. · You may receive an e-mail or letter in the mail from [facility] regarding your experience with us in the Ambulatory Surgery Unit. Your feedback is valuable to us and we appreciate your participation in the survey. · If the above instructions are not adequate, please contact Renee Dill RN, [facility] anesthesia Nurse Manager or our Anesthesiologist, at [phone]. If you are having problems after your surgery, call the physician at his office number. · We wish you a speedy recovery ? What to do at Home:      *  Please give a list of your current medications to your Primary Care Provider. *  Please update this list whenever your medications are discontinued, doses are      changed, or new medications (including over-the-counter products) are added. *  Please carry medication information at all times in case of emergency situations. These are general instructions for a healthy lifestyle:    No smoking/ No tobacco products/ Avoid exposure to second hand smoke    Surgeon General's Warning:  Quitting smoking now greatly reduces serious risk to your health. Obesity, smoking, and sedentary lifestyle greatly increases your risk for illness    A healthy diet, regular physical exercise & weight monitoring are important for maintaining a healthy lifestyle    You may be retaining fluid if you have a history of heart failure or if you experience any of the following symptoms:  Weight gain of 3 pounds or more overnight or 5 pounds in a week, increased swelling in our hands or feet or shortness of breath while lying flat in bed. Please call your doctor as soon as you notice any of these symptoms; do not wait until your next office visit.     Recognize signs and symptoms of STROKE:    B - Balance  E - Eyes    F-  Face looks uneven    A-  Arms unable to move or move even    S-  Speech slurred or non-existent    T-  Time-call 911 as soon as signs and symptoms begin-DO NOT go       Back to bed or wait to see if you get better-TIME IS BRAIN. If you have not received your influenza and/or pneumococcal vaccine, please follow up with your primary care physician. The discharge information has been reviewed with the patient and spouse. The patient and spouse verbalized understanding.

## 2019-01-30 NOTE — ANESTHESIA PREPROCEDURE EVALUATION
Anesthetic History No history of anesthetic complications PONV Review of Systems / Medical History Patient summary reviewed, nursing notes reviewed and pertinent labs reviewed Pulmonary Smoker Comments: Former smoker Neuro/Psych Psychiatric history Comments: anxiety Cardiovascular Within defined limits Hypertension: well controlled Exercise tolerance: >4 METS 
  
GI/Hepatic/Renal 
  
GERD: well controlled Hiatal hernia Endo/Other Arthritis Other Findings Comments: History of polyps Chronic pain Physical Exam 
 
Airway Mallampati: I 
TM Distance: > 6 cm Neck ROM: normal range of motion Mouth opening: Normal 
 
 Cardiovascular Rhythm: regular Rate: normal 
 
 
 
 Dental 
 
Dentition: Caps/crowns, Upper dentition intact and Lower dentition intact Pulmonary Breath sounds clear to auscultation Abdominal 
GI exam deferred Other Findings Anesthetic Plan ASA: 2 Anesthesia type: total IV anesthesia and MAC Induction: Intravenous Anesthetic plan and risks discussed with: Patient

## 2019-01-30 NOTE — PERIOP NOTES
Ryan Conn 
1950 
[id number] Situation: 
Verbal report given from: CONSUELO Montes De Oca CRNA, RN Procedure: Procedure(s): 
COLONOSCOPY 
COLONOSCOPY WITH POLYPECTOMY Background: 
 
Preoperative diagnosis: HISTORY OF POLYPS Postoperative diagnosis: Sigmoid Diverticulosis, Recto-Sigmoid Polyp :  Dr. Palak Valenzuela Assistant(s): Circ-1: Tom Bates RN 
Circ-2: Nick Ortiz RN Scrub Tech-1: Clayton Ends Specimens:  
ID Type Source Tests Collected by Time Destination 1 : Recto-Sigmoid Colon Polyp Preservative Sigmoid  Gerald Caban MD 1/30/2019 1124 Pathology Assessment: 
Intra-procedure medications Propofol 220 mg Anesthesia gave intra-procedure sedation and medications, see anesthesia flow sheet Intravenous fluids: Darryl Calico Vital signs stable Abdominal assessment: round and soft nontender Recommendation: All side rails up, bed in low position, wheels locked. Nurse at bedside.

## 2019-01-30 NOTE — INTERVAL H&P NOTE
H&P Update: 
Ellis Newman was seen and examined. History and physical has been reviewed. Significant clinical changes have occurred as noted:  ENT clear;  Cor regular;  Lungs clear;  Abdomen benign;  Rectal pending c-scope; Extremities and neuro WNL.  
 
Signed By: Yusra Lin MD   
 January 30, 2019 10:56 AM

## 2019-01-30 NOTE — ANESTHESIA POSTPROCEDURE EVALUATION
Procedure(s): 
COLONOSCOPY 
COLONOSCOPY WITH POLYPECTOMY. Anesthesia Post Evaluation Patient location during evaluation: PACU Note status: Adequate. Level of consciousness: responsive to verbal stimuli and sleepy but conscious Pain management: satisfactory to patient Airway patency: patent Anesthetic complications: no 
Cardiovascular status: acceptable Respiratory status: acceptable Hydration status: acceptable Comments: +Post-Anesthesia Evaluation and Assessment Patient: Nichole Moon MRN: [id number]  SSN: [id number] YOB: 1950  Age: 79 y.o. Sex: female Cardiovascular Function/Vital Signs /56   Pulse (!) 59   Temp 36.8 °C (98.2 °F)   Resp 12   Ht 5' 1\" (1.549 m)   Wt 64.9 kg (143 lb 2 oz)   SpO2 97%   Breastfeeding? No   BMI 27.04 kg/m² Patient is status post Procedure(s): 
COLONOSCOPY 
COLONOSCOPY WITH POLYPECTOMY. Nausea/Vomiting: Controlled. Postoperative hydration reviewed and adequate. Pain: 
Pain Scale 1: Numeric (0 - 10) (01/30/19 1154) Pain Intensity 1: 0 (01/30/19 1154) Managed. Neurological Status:  
Neuro (WDL): Within Defined Limits (01/30/19 1147) At baseline. Mental Status and Level of Consciousness: Arousable. Pulmonary Status:  
O2 Device: Room air (01/30/19 1147) Adequate oxygenation and airway patent. Complications related to anesthesia: None Post-anesthesia assessment completed. No concerns. Signed By: Santa Stauffer MD  
 1/30/2019 Post anesthesia nausea and vomiting:  controlled Visit Vitals /56 Pulse (!) 59 Temp 36.8 °C (98.2 °F) Resp 12 Ht 5' 1\" (1.549 m) Wt 64.9 kg (143 lb 2 oz) SpO2 97% Breastfeeding? No  
BMI 27.04 kg/m²

## 2019-01-30 NOTE — PERIOP NOTES
Patient states that  Yohana Iverson will be with them for at least 24 hours following today's procedure.

## 2019-01-30 NOTE — H&P
[facility] HISTORY AND PHYSICAL Name:Connor LAINER MR#: [id number] : 1950 ACCOUNT #: [de-identified] ADMIT DATE: 2019 CHIEF COMPLAINT:  For interval followup for previous history of colonic polyps. HISTORY OF PRESENT ILLNESS:  This patient is a 80-year-old female in for a follow-up colonoscopy. She has a history of previous colonic polyps and is in for followup. PAST MEDICAL HISTORY:  Significant for polyps as noted above. There is also a history of hypertension. She has undergone back surgery as well. MEDICATIONS:  Include lisinopril/hydrochlorothiazide, zolpidem, fish oil, vitamin D3, and p.r.n. Advil or Tylenol. She has also had a cholecystectomy and 2 C-sections. ALLERGIES:  CODEINE AND ERYTHROMYCIN. HABITS:  Does not drink. Quit smoking in . FAMILY HISTORY:  Significant for lung, liver, and esophageal cancer. REVIEW OF SYSTEMS:  Significant for back pain and joint pains, otherwise negative. PHYSICAL EXAMINATION:  Pending. ASSESSMENT:  A 80-year-old female with a previous history of colonic polyps. She is in today for follow-up colonoscopy. She is aware of the risks of the procedure including bleeding, perforation, and the risk of missing small polyps. She is agreeable to proceed and will move forward with exam today. MD SOHAM Real/ 
D: 2019 00:52 T: 2019 05:29 
JOB #: [id number] CC: Sherif Cifuentes MD 
CC: Aly Causey MD

## 2019-03-19 ENCOUNTER — HOSPITAL ENCOUNTER (OUTPATIENT)
Dept: ULTRASOUND IMAGING | Age: 69
Discharge: HOME | End: 2019-03-19
Attending: FAMILY MEDICINE
Payer: MEDICARE

## 2019-03-19 DIAGNOSIS — N28.9 KIDNEY DISEASE: ICD-10-CM

## 2019-03-19 PROCEDURE — 76770 US EXAM ABDO BACK WALL COMP: CPT

## 2019-09-23 PROBLEM — Z86.010 ENCOUNTER FOR COLONOSCOPY DUE TO HISTORY OF ADENOMATOUS COLONIC POLYPS: Status: RESOLVED | Noted: 2019-01-30 | Resolved: 2019-09-23

## 2019-09-23 PROBLEM — Z12.11 ENCOUNTER FOR COLONOSCOPY DUE TO HISTORY OF ADENOMATOUS COLONIC POLYPS: Status: RESOLVED | Noted: 2019-01-30 | Resolved: 2019-09-23

## 2020-08-11 ENCOUNTER — HOSPITAL ENCOUNTER (OUTPATIENT)
Dept: MRI IMAGING | Age: 70
Discharge: HOME | End: 2020-08-11
Attending: NURSE PRACTITIONER
Payer: MEDICARE

## 2020-08-11 DIAGNOSIS — M51.36 DDD (DEGENERATIVE DISC DISEASE), LUMBAR: ICD-10-CM

## 2020-08-11 DIAGNOSIS — M47.26 OSTEOARTHRITIS OF SPINE WITH RADICULOPATHY, LUMBAR REGION: ICD-10-CM

## 2020-08-11 DIAGNOSIS — M54.50 LOW BACK PAIN, UNSPECIFIED BACK PAIN LATERALITY, UNSPECIFIED CHRONICITY, UNSPECIFIED WHETHER SCIATICA PRESENT: ICD-10-CM

## 2020-08-11 PROCEDURE — 72148 MRI LUMBAR SPINE W/O DYE: CPT

## 2022-03-18 PROBLEM — M71.38 SYNOVIAL CYST OF LUMBAR SPINE: Status: ACTIVE | Noted: 2018-06-18

## 2023-05-11 RX ORDER — AMOXICILLIN 250 MG
1 CAPSULE ORAL 2 TIMES DAILY
COMMUNITY
Start: 2018-06-18

## 2023-05-11 RX ORDER — ZOLPIDEM TARTRATE 5 MG/1
5 TABLET ORAL NIGHTLY
COMMUNITY

## 2023-05-11 RX ORDER — ACETAMINOPHEN 325 MG/1
TABLET ORAL EVERY 4 HOURS PRN
COMMUNITY

## 2023-05-11 RX ORDER — LAMOTRIGINE 25 MG/1
TABLET ORAL 2 TIMES DAILY
COMMUNITY
Start: 2019-01-29

## 2023-05-11 RX ORDER — LISINOPRIL AND HYDROCHLOROTHIAZIDE 20; 12.5 MG/1; MG/1
1 TABLET ORAL
COMMUNITY

## 2023-11-10 ENCOUNTER — OFFICE VISIT (OUTPATIENT)
Age: 73
End: 2023-11-10

## 2023-11-10 VITALS
BODY MASS INDEX: 27.49 KG/M2 | HEIGHT: 61 IN | SYSTOLIC BLOOD PRESSURE: 144 MMHG | TEMPERATURE: 98.2 F | HEART RATE: 64 BPM | WEIGHT: 145.6 LBS | OXYGEN SATURATION: 98 % | DIASTOLIC BLOOD PRESSURE: 85 MMHG

## 2023-11-10 DIAGNOSIS — J04.0 ACUTE LARYNGITIS: Primary | ICD-10-CM

## 2023-11-10 DIAGNOSIS — J02.9 SORE THROAT: ICD-10-CM

## 2023-11-10 LAB
STREP PYOGENES DNA, POC: NEGATIVE
VALID INTERNAL CONTROL, POC: YES

## 2023-11-10 RX ORDER — PREDNISONE 20 MG/1
TABLET ORAL
Qty: 6 TABLET | Refills: 0 | Status: SHIPPED | OUTPATIENT
Start: 2023-11-10

## 2023-11-10 RX ORDER — AZITHROMYCIN 250 MG/1
TABLET, FILM COATED ORAL
COMMUNITY
Start: 2023-11-10

## 2023-11-10 RX ORDER — SODIUM PICOSULFATE, MAGNESIUM OXIDE, AND ANHYDROUS CITRIC ACID 12; 3.5; 1 G/175ML; G/175ML; MG/175ML
LIQUID ORAL
COMMUNITY
Start: 2023-11-05

## 2023-11-10 RX ORDER — ATENOLOL 25 MG/1
TABLET ORAL
COMMUNITY
Start: 2023-10-24

## 2023-11-10 NOTE — PROGRESS NOTES
Subjective: (As above and below)     The patient/guardian gave verbal consent to treat. Chief Complaint   Patient presents with    New Patient    Pharyngitis     Sore throat and rhaspy voice. Symptoms started on Wednesday with a scratchy throat. Has not ran a fever. Paz Bourne is a 81 y.o. female who presents for evaluation of : sore throat, scratchy throat and hoarse voice. Symptom onset 2 days ago . Preceding illness: none. No other identified aggravating or alleviating factors. Symptoms are constant and overall unchanged. Denies: SOB, vomiting/diarrhea, rashes, fevers, runny nose  Has decent energy. Primarily wants strep test. Declines covid testing today. Review of Systems    Review of Systems - negative except as listed above    Reviewed PmHx, RxHx, FmHx, SocHx, AllgHx and updated in chart.   Family History   Problem Relation Age of Onset    Cancer Brother 64        liver    Arrhythmia Sister     Heart Surgery Father     Coronary Art Dis Father     Cancer Father 64        esophageal    Heart Disease Father     Cancer Mother 68        lung       Past Medical History:   Diagnosis Date    Arthritis     Chronic pain     back; continues even after recent back procedure, pt has appt [facility] Dr. Luciana Hovoer (Neuro on 1/29/19, 1st appt)    Eczema     beneath both breasts     GERD (gastroesophageal reflux disease)     Hypertension     Nausea & vomiting     Nicotine vapor product user     currently uses electronic cigarettes    Psychiatric disorder     anxiety    Unspecified adverse effect of anesthesia     hard to wake up      Social History     Socioeconomic History    Marital status:      Spouse name: None    Number of children: None    Years of education: None    Highest education level: None   Tobacco Use    Smoking status: Former     Packs/day: 1     Types: Cigarettes     Quit date: 4/5/2013     Years since quitting: 10.6    Smokeless tobacco: Current    Tobacco comments:     Quit smoking: Pt is a 89 y/o F with h/o Afib (on Eliquis/ Toprol), s/p DCCVin 2019,  dementia, hypothyroidism, GERD  Pt is a 89 y/o F with h/o Afib (on Eliquis/ Toprol), s/p DCCVin 2019,  dementia, hypothyroidism, GERD presenting with altered mental stat Pt is a 89 y/o F with h/o Afib (on Eliquis/ Toprol), s/p DCCVin 2019,  dementia, hypothyroidism, GERD presenting with altered mental status. Pt admitted for hypoxic respiratory failure and sepsis 2/2 to possible pna.

## (undated) DEVICE — KENDALL DL ECG CABLE AND LEAD WIRE SYSTEM, 3-LEAD, SINGLE PATIENT USE: Brand: KENDALL

## (undated) DEVICE — SOLUTION IRRIG 3000ML 0.9% SOD CHL FLX CONT 0797208] ICU MEDICAL INC]

## (undated) DEVICE — (D)PREP SKN CHLRAPRP APPL 26ML -- CONVERT TO ITEM 371833

## (undated) DEVICE — INFECTION CONTROL KIT SYS

## (undated) DEVICE — SNARE ENDOSCP M L240CM W27MM SHTH DIA2.4MM CHN 2.8MM OVL

## (undated) DEVICE — TOOL 14MH30 LEGEND 14CM 3MM: Brand: MIDAS REX ™

## (undated) DEVICE — STERILE POLYISOPRENE POWDER-FREE SURGICAL GLOVES: Brand: PROTEXIS

## (undated) DEVICE — 1200 GUARD II KIT W/5MM TUBE W/O VAC TUBE: Brand: GUARDIAN

## (undated) DEVICE — SLIM BODY SKIN STAPLER: Brand: APPOSE ULC

## (undated) DEVICE — DRAPE,LAPAROTOMY,PCH,STERILE: Brand: MEDLINE

## (undated) DEVICE — SUTURE VCRL SZ 2-0 L18IN ABSRB UD CT-1 L36MM 1/2 CIR J839D

## (undated) DEVICE — BASIN EMSIS 16OZ GRAPHITE PLAS KID SHP MOLD GRAD FOR ORAL

## (undated) DEVICE — BLADE ES L4IN INSUL EDGE

## (undated) DEVICE — ADHESIVE SKIN CLOSURE 4X22 CM PREMIERPRO EXOFINFUSION DISP

## (undated) DEVICE — BONE WAX WHITE: Brand: BONE WAX WHITE

## (undated) DEVICE — CONTINU-FLO SOLUTION SET, 2 INJECTION SITES, MALE LUER LOCK ADAPTER WITH RETRACTABLE COLLAR, LARGE BORE STOPCOCK WITH ROTATING MALE LUER LOCK EXTENSION SET, 2 INJECTION SITES, MALE LUER LOCK ADAPTER WITH RETRACTABLE COLLAR: Brand: INTERLINK/CONTINU-FLO

## (undated) DEVICE — GAUZE SPONGES,12 PLY: Brand: CURITY

## (undated) DEVICE — STERILE POLYISOPRENE POWDER-FREE SURGICAL GLOVES WITH EMOLLIENT COATING: Brand: PROTEXIS

## (undated) DEVICE — SUTURE V-LOC 180 SZ 0 L12IN ABSRB GRN L37MM GS-21 1/2 CIR VLOCL0316

## (undated) DEVICE — COVER,MAYO STAND,STERILE: Brand: MEDLINE

## (undated) DEVICE — SYR 50ML LR LCK 1ML GRAD NSAF --

## (undated) DEVICE — TRAP SUC MUCOUS 70ML -- MEDICHOICE MEDLINE

## (undated) DEVICE — SOLUTION IV 1000ML 0.9% SOD CHL

## (undated) DEVICE — SOLIDIFIER MEDC 1200ML -- CONVERT TO 356117

## (undated) DEVICE — ENDO CARRY-ON PROCEDURE KIT INCLUDES ENZYMATIC SPONGE, GAUZE, BIOHAZARD LABEL, TRAY, LUBRICANT, DIRTY SCOPE LABEL, WATER LABEL, TRAY, DRAWSTRING PAD, AND DEFENDO 4-PIECE KIT.: Brand: ENDO CARRY-ON PROCEDURE KIT

## (undated) DEVICE — SUTURE VCRL SZ 1 L18IN ABSRB VLT CT-1 L36MM 1/2 CIR J741D

## (undated) DEVICE — BIPOLAR FORCEPS CORD: Brand: VALLEYLAB

## (undated) DEVICE — KIT JACK TBL PT CARE

## (undated) DEVICE — SUTURE STRATAFIX SPRL MCRYL + SZ 2-0 L18IN ABSRB UD CT-1 SXMP1B413

## (undated) DEVICE — Device

## (undated) DEVICE — FLOSEAL MATRIX IS INDICATED IN SURGICAL PROCEDURES (OTHER THAN IN OPHTHALMIC) AS AN ADJUNCT TO HEMOSTASIS WHEN CONTROL OF BLEEDING BY LIGATURE OR CONVENTIONALPROCEDURES IS INEFFECTIVE OR IMPRACTICAL.: Brand: FLOSEAL HEMOSTATIC MATRIX

## (undated) DEVICE — 3M™ STERI-DRAPE™ INSTRUMENT POUCH 1018: Brand: STERI-DRAPE™

## (undated) DEVICE — FCPS BX HOT RJ4 2.2MMX240CM -- RADIAL JAW 4 BX/40

## (undated) DEVICE — SKIN MARKER,REGULAR TIP WITH RULER AND LABELS: Brand: DEVON

## (undated) DEVICE — GOWN,SIRUS,NONRNF,SETINSLV,2XL,18/CS: Brand: MEDLINE

## (undated) DEVICE — TUBING IRRIG L77IN DIA0.241IN L BOR FOR CYSTO W/ NVENT

## (undated) DEVICE — HANDLE LT SNAP ON ULT DURABLE LENS FOR TRUMPF ALC DISPOSABLE

## (undated) DEVICE — NDL SPNE QNCKE 18GX3.5IN LF --

## (undated) DEVICE — TRAY CATH 16F DRN BG LTX -- CONVERT TO ITEM 363158

## (undated) DEVICE — KENDALL SCD EXPRESS SLEEVES, KNEE LENGTH, MEDIUM: Brand: KENDALL SCD

## (undated) DEVICE — MEDI-VAC NON-CONDUCTIVE SUCTION TUBING: Brand: CARDINAL HEALTH

## (undated) DEVICE — STRAINER URIN CALC RNL MSH -- CONVERT TO ITEM 357634

## (undated) DEVICE — DRAPE MICSCP W54XL150IN STD OCU CVR CLEARLENS TECHNOLOGY FOR

## (undated) DEVICE — SOLUTION LACTATED RINGERS INJECTION USP

## (undated) DEVICE — 3M™ TEGADERM™ TRANSPARENT FILM DRESSING FRAME STYLE, 1624W, 2-3/8 IN X 2-3/4 IN (6 CM X 7 CM), 100/CT 4CT/CASE: Brand: 3M™ TEGADERM™

## (undated) DEVICE — NEEDLE HYPO 18GA L1.5IN PNK S STL HUB POLYPR SHLD REG BVL

## (undated) DEVICE — LAMINECTOMY RICHMOND-LF: Brand: MEDLINE INDUSTRIES, INC.

## (undated) DEVICE — Z CONVERTED USE 2107985 COVER FLROSCP W36XL28IN 4 SIDE ADH

## (undated) DEVICE — TOWEL SURG W17XL27IN STD BLU COT NONFENESTRATED PREWASHED